# Patient Record
Sex: FEMALE | Race: WHITE | URBAN - METROPOLITAN AREA
[De-identification: names, ages, dates, MRNs, and addresses within clinical notes are randomized per-mention and may not be internally consistent; named-entity substitution may affect disease eponyms.]

---

## 2018-10-09 ENCOUNTER — APPOINTMENT (RX ONLY)
Dept: URBAN - METROPOLITAN AREA CLINIC 99 | Facility: CLINIC | Age: 68
Setting detail: DERMATOLOGY
End: 2018-10-09

## 2018-10-09 DIAGNOSIS — Z12.83 ENCOUNTER FOR SCREENING FOR MALIGNANT NEOPLASM OF SKIN: ICD-10-CM

## 2018-10-09 DIAGNOSIS — L81.4 OTHER MELANIN HYPERPIGMENTATION: ICD-10-CM

## 2018-10-09 DIAGNOSIS — D22 MELANOCYTIC NEVI: ICD-10-CM

## 2018-10-09 DIAGNOSIS — L82.1 OTHER SEBORRHEIC KERATOSIS: ICD-10-CM

## 2018-10-09 DIAGNOSIS — L57.0 ACTINIC KERATOSIS: ICD-10-CM

## 2018-10-09 PROBLEM — F41.9 ANXIETY DISORDER, UNSPECIFIED: Status: ACTIVE | Noted: 2018-10-09

## 2018-10-09 PROBLEM — M12.9 ARTHROPATHY, UNSPECIFIED: Status: ACTIVE | Noted: 2018-10-09

## 2018-10-09 PROBLEM — D22.5 MELANOCYTIC NEVI OF TRUNK: Status: ACTIVE | Noted: 2018-10-09

## 2018-10-09 PROBLEM — R23.3 SPONTANEOUS ECCHYMOSES: Status: ACTIVE | Noted: 2018-10-09

## 2018-10-09 PROBLEM — E78.5 HYPERLIPIDEMIA, UNSPECIFIED: Status: ACTIVE | Noted: 2018-10-09

## 2018-10-09 PROBLEM — I10 ESSENTIAL (PRIMARY) HYPERTENSION: Status: ACTIVE | Noted: 2018-10-09

## 2018-10-09 PROCEDURE — 99213 OFFICE O/P EST LOW 20 MIN: CPT | Mod: 25

## 2018-10-09 PROCEDURE — 17003 DESTRUCT PREMALG LES 2-14: CPT

## 2018-10-09 PROCEDURE — 17000 DESTRUCT PREMALG LESION: CPT

## 2018-10-09 PROCEDURE — ? COUNSELING

## 2018-10-09 PROCEDURE — ? LIQUID NITROGEN

## 2018-10-09 ASSESSMENT — LOCATION ZONE DERM
LOCATION ZONE: NOSE
LOCATION ZONE: FACE
LOCATION ZONE: ARM
LOCATION ZONE: SCALP
LOCATION ZONE: TRUNK

## 2018-10-09 ASSESSMENT — LOCATION SIMPLE DESCRIPTION DERM
LOCATION SIMPLE: LEFT SCALP
LOCATION SIMPLE: RIGHT FOREARM
LOCATION SIMPLE: CHEST
LOCATION SIMPLE: LEFT UPPER BACK
LOCATION SIMPLE: ABDOMEN
LOCATION SIMPLE: RIGHT FOREHEAD
LOCATION SIMPLE: NOSE

## 2018-10-09 ASSESSMENT — LOCATION DETAILED DESCRIPTION DERM
LOCATION DETAILED: RIGHT DISTAL DORSAL FOREARM
LOCATION DETAILED: NASAL DORSUM
LOCATION DETAILED: RIGHT LATERAL SUPERIOR CHEST
LOCATION DETAILED: LEFT MID-UPPER BACK
LOCATION DETAILED: LEFT MEDIAL FRONTAL SCALP
LOCATION DETAILED: RIGHT LATERAL FOREHEAD
LOCATION DETAILED: RIGHT SUPERIOR MEDIAL FOREHEAD
LOCATION DETAILED: SUBXIPHOID
LOCATION DETAILED: PERIUMBILICAL SKIN
LOCATION DETAILED: EPIGASTRIC SKIN

## 2018-10-09 NOTE — PROCEDURE: LIQUID NITROGEN
Detail Level: Detailed
Post-Care Instructions: I reviewed with the patient in detail post-care instructions. Patient is to wear sunprotection, and avoid picking at any of the treated lesions. Pt may apply Vaseline to crusted or scabbing areas.
Number Of Freeze-Thaw Cycles: 2 freeze-thaw cycles
Duration Of Freeze Thaw-Cycle (Seconds): 10
Consent: The patient's consent was obtained including but not limited to risks of crusting, scabbing, blistering, scarring, darker or lighter pigmentary change, recurrence, incomplete removal and infection.
Render Post-Care Instructions In Note?: no

## 2021-06-15 ENCOUNTER — TRANSCRIBE ORDERS (OUTPATIENT)
Dept: ADMINISTRATIVE | Facility: HOSPITAL | Age: 71
End: 2021-06-15

## 2021-06-15 DIAGNOSIS — R10.13 EPIGASTRIC PAIN: Primary | ICD-10-CM

## 2021-06-17 ENCOUNTER — TRANSCRIBE ORDERS (OUTPATIENT)
Dept: ADMINISTRATIVE | Facility: HOSPITAL | Age: 71
End: 2021-06-17

## 2021-06-23 ENCOUNTER — LAB (OUTPATIENT)
Dept: LAB | Facility: HOSPITAL | Age: 71
End: 2021-06-23

## 2021-06-23 DIAGNOSIS — R10.9 ABDOMINAL PAIN, UNSPECIFIED ABDOMINAL LOCATION: ICD-10-CM

## 2021-06-23 DIAGNOSIS — R10.9 ABDOMINAL PAIN, UNSPECIFIED ABDOMINAL LOCATION: Primary | ICD-10-CM

## 2021-06-23 LAB — SARS-COV-2 RNA PNL SPEC NAA+PROBE: NOT DETECTED

## 2021-06-23 PROCEDURE — C9803 HOPD COVID-19 SPEC COLLECT: HCPCS

## 2021-06-23 PROCEDURE — 87635 SARS-COV-2 COVID-19 AMP PRB: CPT

## 2021-06-24 ENCOUNTER — HOSPITAL ENCOUNTER (OUTPATIENT)
Dept: GENERAL RADIOLOGY | Facility: HOSPITAL | Age: 71
Discharge: HOME | End: 2021-06-24
Admitting: SURGERY

## 2021-06-24 DIAGNOSIS — R10.13 EPIGASTRIC PAIN: ICD-10-CM

## 2021-06-24 PROCEDURE — 63710000001 BARIUM SULFATE 700 MG TABLET: Performed by: SURGERY

## 2021-06-24 PROCEDURE — A9270 NON-COVERED ITEM OR SERVICE: HCPCS | Performed by: SURGERY

## 2021-06-24 PROCEDURE — 63710000001 BARIUM SULFATE 98 % RECONSTITUTED SUSPENSION: Performed by: SURGERY

## 2021-06-24 PROCEDURE — 63710000001 BARIUM SULFATE 96 % RECONSTITUTED SUSPENSION: Performed by: SURGERY

## 2021-06-24 PROCEDURE — 74220 X-RAY XM ESOPHAGUS 1CNTRST: CPT

## 2021-06-24 RX ADMIN — BARIUM SULFATE 183 ML: 960 POWDER, FOR SUSPENSION ORAL at 09:04

## 2021-06-24 RX ADMIN — BARIUM SULFATE 700 MG: 700 TABLET ORAL at 09:04

## 2021-06-24 RX ADMIN — BARIUM SULFATE 135 ML: 980 POWDER, FOR SUSPENSION ORAL at 09:04

## 2022-08-10 ENCOUNTER — OFFICE (AMBULATORY)
Dept: URBAN - METROPOLITAN AREA PATHOLOGY 4 | Facility: PATHOLOGY | Age: 72
End: 2022-08-10
Payer: COMMERCIAL

## 2022-08-10 ENCOUNTER — OFFICE (AMBULATORY)
Dept: URBAN - METROPOLITAN AREA PATHOLOGY 4 | Facility: PATHOLOGY | Age: 72
End: 2022-08-10

## 2022-08-10 ENCOUNTER — ON CAMPUS - OUTPATIENT (AMBULATORY)
Dept: URBAN - METROPOLITAN AREA HOSPITAL 2 | Facility: HOSPITAL | Age: 72
End: 2022-08-10

## 2022-08-10 VITALS
SYSTOLIC BLOOD PRESSURE: 142 MMHG | RESPIRATION RATE: 16 BRPM | SYSTOLIC BLOOD PRESSURE: 155 MMHG | HEART RATE: 66 BPM | DIASTOLIC BLOOD PRESSURE: 74 MMHG | DIASTOLIC BLOOD PRESSURE: 60 MMHG | SYSTOLIC BLOOD PRESSURE: 147 MMHG | SYSTOLIC BLOOD PRESSURE: 128 MMHG | OXYGEN SATURATION: 97 % | RESPIRATION RATE: 18 BRPM | HEART RATE: 62 BPM | SYSTOLIC BLOOD PRESSURE: 115 MMHG | HEART RATE: 68 BPM | HEIGHT: 65 IN | SYSTOLIC BLOOD PRESSURE: 118 MMHG | RESPIRATION RATE: 14 BRPM | TEMPERATURE: 96.4 F | OXYGEN SATURATION: 98 % | DIASTOLIC BLOOD PRESSURE: 41 MMHG | SYSTOLIC BLOOD PRESSURE: 137 MMHG | OXYGEN SATURATION: 99 % | HEART RATE: 64 BPM | HEART RATE: 79 BPM | DIASTOLIC BLOOD PRESSURE: 100 MMHG | SYSTOLIC BLOOD PRESSURE: 104 MMHG | DIASTOLIC BLOOD PRESSURE: 79 MMHG | HEART RATE: 76 BPM | HEART RATE: 78 BPM | HEART RATE: 69 BPM | RESPIRATION RATE: 20 BRPM | SYSTOLIC BLOOD PRESSURE: 114 MMHG | WEIGHT: 229 LBS | DIASTOLIC BLOOD PRESSURE: 75 MMHG | OXYGEN SATURATION: 96 % | OXYGEN SATURATION: 100 % | DIASTOLIC BLOOD PRESSURE: 63 MMHG | DIASTOLIC BLOOD PRESSURE: 53 MMHG | DIASTOLIC BLOOD PRESSURE: 42 MMHG | SYSTOLIC BLOOD PRESSURE: 149 MMHG

## 2022-08-10 DIAGNOSIS — R13.10 DYSPHAGIA, UNSPECIFIED: ICD-10-CM

## 2022-08-10 DIAGNOSIS — D12.3 BENIGN NEOPLASM OF TRANSVERSE COLON: ICD-10-CM

## 2022-08-10 DIAGNOSIS — K64.1 SECOND DEGREE HEMORRHOIDS: ICD-10-CM

## 2022-08-10 DIAGNOSIS — Z86.010 PERSONAL HISTORY OF COLONIC POLYPS: ICD-10-CM

## 2022-08-10 DIAGNOSIS — K57.30 DIVERTICULOSIS OF LARGE INTESTINE WITHOUT PERFORATION OR ABS: ICD-10-CM

## 2022-08-10 PROBLEM — K63.5 POLYP OF COLON: Status: ACTIVE | Noted: 2022-08-10

## 2022-08-10 LAB
GI HISTOLOGY: A. UNSPECIFIED: (no result)
GI HISTOLOGY: PDF REPORT: (no result)

## 2022-08-10 PROCEDURE — 43235 EGD DIAGNOSTIC BRUSH WASH: CPT | Performed by: INTERNAL MEDICINE

## 2022-08-10 PROCEDURE — 43450 DILATE ESOPHAGUS 1/MULT PASS: CPT | Performed by: INTERNAL MEDICINE

## 2022-08-10 PROCEDURE — 88305 TISSUE EXAM BY PATHOLOGIST: CPT | Mod: 26 | Performed by: INTERNAL MEDICINE

## 2022-08-10 PROCEDURE — 45380 COLONOSCOPY AND BIOPSY: CPT | Mod: PT | Performed by: INTERNAL MEDICINE

## 2022-12-02 ENCOUNTER — APPOINTMENT (OUTPATIENT)
Dept: GENERAL RADIOLOGY | Facility: HOSPITAL | Age: 72
End: 2022-12-02

## 2022-12-02 ENCOUNTER — HOSPITAL ENCOUNTER (OUTPATIENT)
Facility: HOSPITAL | Age: 72
Discharge: HOME | End: 2022-12-02
Attending: EMERGENCY MEDICINE | Admitting: EMERGENCY MEDICINE

## 2022-12-02 VITALS
OXYGEN SATURATION: 98 % | HEART RATE: 67 BPM | DIASTOLIC BLOOD PRESSURE: 53 MMHG | TEMPERATURE: 98.4 F | BODY MASS INDEX: 36.65 KG/M2 | HEIGHT: 65 IN | WEIGHT: 220 LBS | RESPIRATION RATE: 18 BRPM | SYSTOLIC BLOOD PRESSURE: 99 MMHG

## 2022-12-02 DIAGNOSIS — J40 BRONCHITIS: Primary | ICD-10-CM

## 2022-12-02 DIAGNOSIS — R05.9 COUGH IN ADULT: ICD-10-CM

## 2022-12-02 PROBLEM — N64.89: Status: ACTIVE | Noted: 2022-12-02

## 2022-12-02 PROBLEM — D72.829 LEUKOCYTOSIS: Status: ACTIVE | Noted: 2022-01-04

## 2022-12-02 PROBLEM — G89.29 CHRONIC HEADACHE DISORDER: Status: ACTIVE | Noted: 2022-12-02

## 2022-12-02 PROBLEM — H40.9 GLAUCOMA: Status: ACTIVE | Noted: 2022-12-02

## 2022-12-02 PROBLEM — F41.0 ANXIETY ATTACK: Status: ACTIVE | Noted: 2022-12-02

## 2022-12-02 PROBLEM — K64.9 HEMORRHOID: Status: ACTIVE | Noted: 2022-12-02

## 2022-12-02 PROBLEM — R00.2 PALPITATIONS: Status: ACTIVE | Noted: 2022-01-06

## 2022-12-02 PROBLEM — G47.30 SLEEP APNEA: Status: ACTIVE | Noted: 2022-12-02

## 2022-12-02 PROBLEM — H35.40 PERIPHERAL RETINAL DEGENERATION: Status: ACTIVE | Noted: 2019-08-02

## 2022-12-02 PROBLEM — R06.02 SHORTNESS OF BREATH: Status: ACTIVE | Noted: 2020-09-02

## 2022-12-02 PROBLEM — R51.9 CHRONIC HEADACHE DISORDER: Status: ACTIVE | Noted: 2022-12-02

## 2022-12-02 PROBLEM — H26.9 CATARACT: Status: ACTIVE | Noted: 2022-12-02

## 2022-12-02 PROBLEM — I10 HTN (HYPERTENSION): Status: ACTIVE | Noted: 2021-11-19

## 2022-12-02 PROBLEM — N39.0 RECURRENT URINARY TRACT INFECTION: Status: ACTIVE | Noted: 2022-12-02

## 2022-12-02 LAB
FLUAV SUBTYP SPEC NAA+PROBE: NOT DETECTED
FLUBV RNA ISLT QL NAA+PROBE: NOT DETECTED
SARS-COV-2 RNA RESP QL NAA+PROBE: NOT DETECTED

## 2022-12-02 PROCEDURE — 87636 SARSCOV2 & INF A&B AMP PRB: CPT | Performed by: EMERGENCY MEDICINE

## 2022-12-02 PROCEDURE — G0463 HOSPITAL OUTPT CLINIC VISIT: HCPCS | Performed by: NURSE PRACTITIONER

## 2022-12-02 PROCEDURE — 71046 X-RAY EXAM CHEST 2 VIEWS: CPT

## 2022-12-02 PROCEDURE — 99203 OFFICE O/P NEW LOW 30 MIN: CPT | Performed by: NURSE PRACTITIONER

## 2022-12-02 RX ORDER — TIZANIDINE 4 MG/1
4 TABLET ORAL
COMMUNITY

## 2022-12-02 RX ORDER — CHOLECALCIFEROL (VITAMIN D3) 125 MCG
10 CAPSULE ORAL
COMMUNITY

## 2022-12-02 RX ORDER — PRAVASTATIN SODIUM 40 MG
40 TABLET ORAL DAILY
COMMUNITY

## 2022-12-02 RX ORDER — METHYLPREDNISOLONE 4 MG/1
TABLET ORAL
Qty: 21 TABLET | Refills: 0 | Status: SHIPPED | OUTPATIENT
Start: 2022-12-02

## 2022-12-02 RX ORDER — DARIFENACIN HYDROBROMIDE 7.5 MG/1
15 TABLET, EXTENDED RELEASE ORAL DAILY
COMMUNITY

## 2022-12-02 RX ORDER — OLMESARTAN MEDOXOMIL AND HYDROCHLOROTHIAZIDE 40/12.5 40; 12.5 MG/1; MG/1
1 TABLET ORAL DAILY
COMMUNITY

## 2022-12-02 RX ORDER — CITALOPRAM 40 MG/1
40 TABLET ORAL DAILY
COMMUNITY

## 2022-12-02 RX ORDER — BENZONATATE 200 MG/1
200 CAPSULE ORAL 3 TIMES DAILY PRN
Qty: 15 CAPSULE | Refills: 0 | Status: SHIPPED | OUTPATIENT
Start: 2022-12-02

## 2022-12-02 RX ORDER — PROPRANOLOL HYDROCHLORIDE 120 MG/1
120 CAPSULE, EXTENDED RELEASE ORAL DAILY
COMMUNITY

## 2022-12-02 NOTE — DISCHARGE INSTRUCTIONS
Thank you for letting us care for you today.  You have tested negative for COVID and influenza.  You are being treated with steroids and cough medication.  Please ensure that you schedule an appointment with your primary care provider to ensure complete recovery.

## 2022-12-02 NOTE — FSED PROVIDER NOTE
EMERGENCY DEPARTMENT ENCOUNTER      Room Number: 10/10    History is provided by the patient, no translation services needed    HPI:    Chief complaint: Fatigue, fever, cough, congestion, rhinorrhea, postnasal drip, decreased appetite, headache    Quality/Severity: Moderate cough    Timing/Duration: last Thursday and has continued    Modifying Factors: Not made better with over-the-counter medications    Associated Symptoms: Spouse is positive with same symptoms    Narrative: Pt is a 72 y.o. female with a past medical history of hypertension, sleep apnea, chronic headache who presents complaining of Fatigue, fever, cough, congestion, rhinorrhea, postnasal drip, decreased appetite, headache that began last Thursday and has continued.  She states she has been taking over-the-counter medication with no relief in her symptoms.  She states the cough does affect her ability to sleep.  She describes it as a harsh cough but she is unable to cough up any secretions.  She denies of shortness of breath, dizziness, syncopal episodes.  She is alert, oriented, no acute distress      PMD: Zachariah Dow MD    REVIEW OF SYSTEMS  Review of Systems   Constitutional: Positive for appetite change, fatigue and fever. Negative for activity change, chills, diaphoresis and unexpected weight change.   HENT: Positive for congestion, postnasal drip, rhinorrhea and sneezing. Negative for facial swelling, sinus pressure, sinus pain and sore throat.    Eyes: Negative for itching and visual disturbance.   Respiratory: Positive for cough. Negative for chest tightness and shortness of breath.    Cardiovascular: Negative for chest pain, palpitations and leg swelling.   Gastrointestinal: Positive for diarrhea and nausea. Negative for vomiting.   Genitourinary: Negative.    Musculoskeletal: Negative for arthralgias and myalgias.   Skin: Negative for pallor and rash.   Allergic/Immunologic: Negative.    Neurological: Positive for headaches.  Negative for dizziness, weakness and light-headedness.   Hematological: Negative.    Psychiatric/Behavioral: Negative.          PAST MEDICAL HISTORY  Active Ambulatory Problems     Diagnosis Date Noted   • Hemorrhoid 12/02/2022   • Anxiety attack 12/02/2022   • Breast mass 02/21/2014   • Cataract 12/02/2022   • Chronic headache disorder 12/02/2022   • Glaucoma 12/02/2022   • H/O laparoscopic adjustable gastric banding 04/18/2014   • Hematoma 02/24/2014   • HTN (hypertension) 11/19/2021   • Leukocytosis 01/04/2022   • Nontraumatic hematoma of breast 12/02/2022   • Temporomandibular joint disorder 04/18/2014   • Palpitations 01/06/2022   • Peripheral retinal degeneration 08/02/2019   • Recurrent urinary tract infection 12/02/2022   • Shortness of breath 09/02/2020   • Sleep apnea 12/02/2022     Resolved Ambulatory Problems     Diagnosis Date Noted   • No Resolved Ambulatory Problems     No Additional Past Medical History       PAST SURGICAL HISTORY  History reviewed. No pertinent surgical history.    FAMILY HISTORY  History reviewed. No pertinent family history.    SOCIAL HISTORY  Social History     Socioeconomic History   • Marital status:        ALLERGIES  Sulfa antibiotics, Codeine, Hydrocodone-acetaminophen, Meperidine, and Tramadol    No current facility-administered medications for this encounter.    Current Outpatient Medications:   •  benzonatate (TESSALON) 200 MG capsule, Take 1 capsule by mouth 3 (Three) Times a Day As Needed for Cough., Disp: 15 capsule, Rfl: 0  •  citalopram (CeleXA) 40 MG tablet, Take 40 mg by mouth Daily., Disp: , Rfl:   •  darifenacin (ENABLEX) 7.5 MG 24 hr tablet, Take 15 mg by mouth Daily., Disp: , Rfl:   •  melatonin 5 MG tablet tablet, 10 mg., Disp: , Rfl:   •  methylPREDNISolone (MEDROL) 4 MG dose pack, Take as directed on package instructions., Disp: 21 tablet, Rfl: 0  •  Mirabegron ER (MYRBETRIQ) 50 MG tablet sustained-release 24 hour 24 hr tablet, Take 50 mg by mouth  Daily., Disp: , Rfl:   •  olmesartan-hydrochlorothiazide (BENICAR HCT) 40-12.5 MG per tablet, Take 1 tablet by mouth Daily., Disp: , Rfl:   •  pravastatin (PRAVACHOL) 40 MG tablet, Take 40 mg by mouth Daily., Disp: , Rfl:   •  propranolol LA (INDERAL LA) 120 MG 24 hr capsule, Take 120 mg by mouth Daily., Disp: , Rfl:   •  tiZANidine (ZANAFLEX) 4 MG tablet, Take 4 mg by mouth., Disp: , Rfl:     PHYSICAL EXAM  ED Triage Vitals   Temp Heart Rate Resp BP SpO2   12/02/22 0959 12/02/22 0959 12/02/22 0959 12/02/22 1004 12/02/22 0959   98.4 °F (36.9 °C) 67 18 99/53 98 %      Temp src Heart Rate Source Patient Position BP Location FiO2 (%)   12/02/22 0959 12/02/22 0959 12/02/22 0959 12/02/22 0959 --   Oral Monitor Sitting Right arm        Physical Exam  Vitals and nursing note reviewed.   Constitutional:       General: She is not in acute distress.     Appearance: Normal appearance. She is obese. She is not ill-appearing, toxic-appearing or diaphoretic.   HENT:      Head: Normocephalic and atraumatic.      Right Ear: Tympanic membrane, ear canal and external ear normal. There is no impacted cerumen.      Left Ear: Tympanic membrane, ear canal and external ear normal. There is no impacted cerumen.      Nose: Nose normal. No congestion or rhinorrhea.      Mouth/Throat:      Mouth: Mucous membranes are moist.      Pharynx: Oropharynx is clear. No oropharyngeal exudate or posterior oropharyngeal erythema.   Eyes:      Extraocular Movements: Extraocular movements intact.      Conjunctiva/sclera: Conjunctivae normal.   Cardiovascular:      Rate and Rhythm: Normal rate and regular rhythm.      Pulses: Normal pulses.      Heart sounds: Normal heart sounds.   Pulmonary:      Effort: Pulmonary effort is normal.      Breath sounds: Examination of the right-lower field reveals rales. Rales present.   Musculoskeletal:         General: Normal range of motion.      Cervical back: Normal range of motion and neck supple. No rigidity or  tenderness.   Lymphadenopathy:      Cervical: No cervical adenopathy.   Skin:     General: Skin is warm and dry.      Capillary Refill: Capillary refill takes less than 2 seconds.   Neurological:      General: No focal deficit present.      Mental Status: She is alert and oriented to person, place, and time.   Psychiatric:         Mood and Affect: Mood normal.         Behavior: Behavior normal.           LAB RESULTS  Lab Results (last 24 hours)     Procedure Component Value Units Date/Time    COVID-19 and FLU A/B PCR - Swab, Nasopharynx [[id number]]  (Normal) Collected: 12/02/22 1009    Specimen: Swab from Nasopharynx Updated: 12/02/22 1033     COVID19 Not Detected     Influenza A PCR Not Detected     Influenza B PCR Not Detected    Narrative:      Fact sheet for providers: https://www.fda.gov/media/203850/download    Fact sheet for patients: https://www.fda.gov/media/284886/download    Test performed by PCR.            I ordered the above labs and reviewed the results    RADIOLOGY  XR Chest 2 View    Result Date: 12/2/2022  DATE OF EXAM: 12/2/2022 10:59 AM  PROCEDURE: XR CHEST 2 VW-  INDICATIONS: Cough, past tobacco abuse.   COMPARISON: No Comparisons Available  TECHNIQUE: Two radiologic views of the chest.  FINDINGS: The heart size is normal. The pulmonary vascular markings are normal. The lungs and pleural spaces are clear of active disease.  There are chronic age-related changes involving the bony thorax.      No active disease.  Electronically Signed By-Charly Miranda MD On:12/2/2022 11:05 AM This report was finalized on [id number] by  Charly Miranda MD.      I ordered the above radiologic testing and reviewed the results    PROCEDURES  Procedures      PROGRESS AND CONSULTS  ED Course as of 12/02/22 1127   Fri Dec 02, 2022   1033 Negative for covid and flu [VT]   1110 IMPRESSION:  No active disease.     Electronically Signed By-Charly Miranda MD On:12/2/2022 11:05 AM  This report was finalized on [id number] by   Charly Miranda MD. [VT]   1125 Negative chest x-ray discussed with patient.  Plan of care discussed with patient [VT]      ED Course User Index  [VT] Nichol Redding, CAILIN           MEDICAL DECISION MAKING    MDM       DIAGNOSIS  Final diagnoses:   Bronchitis   Cough in adult       Latest Documented Vital Signs:  As of 11:27 EST  BP- 99/53 HR- 67 Temp- 98.4 °F (36.9 °C) (Oral) O2 sat- 98%    DISPOSITION      Discussed pertinent findings with the patient/family.  Patient/Family voiced understanding of need to follow-up for recheck and further testing as needed.  Return to the Emergency Department warnings were given.         Medication List      New Prescriptions    benzonatate 200 MG capsule  Commonly known as: TESSALON  Take 1 capsule by mouth 3 (Three) Times a Day As Needed for Cough.     methylPREDNISolone 4 MG dose pack  Commonly known as: MEDROL  Take as directed on package instructions.           Where to Get Your Medications      These medications were sent to [facility] [id number] [place], [place] - [facility] - [phone] [facility] [phone] [address] [place] [address]    Phone: [phone]   · benzonatate 200 MG capsule  · methylPREDNISolone 4 MG dose pack              Follow-up Information     Zachariah Dow MD. Call in 3 days.    Specialty: Family Medicine  Why: As needed, If symptoms worsen  Contact information:  [address]  [facility] [address]  [place] [address]  [phone]                           Dictated utilizing Dragon dictation

## 2023-04-13 ENCOUNTER — HOSPITAL ENCOUNTER (OUTPATIENT)
Facility: HOSPITAL | Age: 73
Discharge: HOME | End: 2023-04-13
Attending: EMERGENCY MEDICINE | Admitting: EMERGENCY MEDICINE
Payer: MEDICARE

## 2023-04-13 VITALS
SYSTOLIC BLOOD PRESSURE: 121 MMHG | WEIGHT: 220 LBS | OXYGEN SATURATION: 97 % | HEIGHT: 65 IN | BODY MASS INDEX: 36.65 KG/M2 | RESPIRATION RATE: 16 BRPM | DIASTOLIC BLOOD PRESSURE: 56 MMHG | HEART RATE: 54 BPM | TEMPERATURE: 97.9 F

## 2023-04-13 DIAGNOSIS — U07.1 COVID-19 VIRUS DETECTED: Primary | ICD-10-CM

## 2023-04-13 LAB
FLUAV SUBTYP SPEC NAA+PROBE: NOT DETECTED
FLUBV RNA ISLT QL NAA+PROBE: NOT DETECTED
SARS-COV-2 RNA RESP QL NAA+PROBE: DETECTED

## 2023-04-13 PROCEDURE — 87636 SARSCOV2 & INF A&B AMP PRB: CPT | Performed by: EMERGENCY MEDICINE

## 2023-04-13 PROCEDURE — G0463 HOSPITAL OUTPT CLINIC VISIT: HCPCS | Performed by: NURSE PRACTITIONER

## 2023-04-13 RX ORDER — DEXTROMETHORPHAN HYDROBROMIDE AND PROMETHAZINE HYDROCHLORIDE 15; 6.25 MG/5ML; MG/5ML
5 SYRUP ORAL 3 TIMES DAILY PRN
Qty: 118 ML | Refills: 0 | Status: SHIPPED | OUTPATIENT
Start: 2023-04-13

## 2023-04-13 NOTE — FSED PROVIDER NOTE
EMERGENCY DEPARTMENT ENCOUNTER    Room Number:  CESARIO/CESARIO  Date seen:  4/13/2023  Time seen: 12:09 EDT  PCP: Zachariah Dow MD  Historian: patient    HPI:  Chief complaint:cough and headache  A complete HPI/ROS/PMH/PSH/SH/FH are unobtainable due to: n/a  Context:Sofia Chavez is a 73 y.o. female with h/o HTN, hyperlipidemia and leukocytosis who presents to the ED with c/o cough and headache that started last Friday.  Symptoms are moderate and she has been taking tylenol and tessalon perles.  Denies shortness of breath, n/v but has had some diarrhea.  Is vaccinated against Covid 19.  States last week she had appointment for lumbar epidural steroid injection.        Social determinants of health which may impact assessment: n/a    Review of prior external notes (non-ED): n/a    Review of prior external test results outside of this encounter: n/a    ALLERGIES  Sulfa antibiotics, Codeine, Hydrocodone-acetaminophen, Meperidine, and Tramadol    PAST MEDICAL HISTORY  Active Ambulatory Problems     Diagnosis Date Noted   • Hemorrhoid 12/02/2022   • Anxiety attack 12/02/2022   • Breast mass 02/21/2014   • Cataract 12/02/2022   • Chronic headache disorder 12/02/2022   • Glaucoma 12/02/2022   • H/O laparoscopic adjustable gastric banding 04/18/2014   • Hematoma 02/24/2014   • HTN (hypertension) 11/19/2021   • Leukocytosis 01/04/2022   • Nontraumatic hematoma of breast 12/02/2022   • Temporomandibular joint disorder 04/18/2014   • Palpitations 01/06/2022   • Peripheral retinal degeneration 08/02/2019   • Recurrent urinary tract infection 12/02/2022   • Shortness of breath 09/02/2020   • Sleep apnea 12/02/2022     Resolved Ambulatory Problems     Diagnosis Date Noted   • No Resolved Ambulatory Problems     No Additional Past Medical History       PAST SURGICAL HISTORY  No past surgical history on file.    FAMILY HISTORY  No family history on file.    SOCIAL HISTORY  Social History     Socioeconomic History   •  Marital status:        REVIEW OF SYSTEMS  Review of Systems    All systems reviewed and negative except for those discussed in HPI.     PHYSICAL EXAM    I have reviewed the triage vital signs and nursing notes.  Vitals:    04/13/23 1038   BP:    Pulse:    Resp:    Temp: 97.9 °F (36.6 °C)   SpO2:      Physical Exam    GENERAL: not distressed  HENT: nares patent, TM's normal bilaterally, no tonsillar edema or erythema  EYES: no scleral icterus  NECK: no ROM limitations  CV: regular rhythm, regular rate, no murmur  RESPIRATORY: normal effort, CTAB, no wheezing  ABDOMEN: soft  : deferred  MUSCULOSKELETAL: no deformity  NEURO: alert, moves all extremities, follows commands  SKIN: warm, dry    LAB RESULTS  Recent Results (from the past 24 hour(s))   COVID-19 and FLU A/B PCR - Swab, Nasopharynx    Collection Time: 04/13/23 10:42 AM    Specimen: Nasopharynx; Swab   Result Value Ref Range    COVID19 Detected (C) Not Detected - Ref. Range    Influenza A PCR Not Detected Not Detected    Influenza B PCR Not Detected Not Detected       Ordered the above labs and independently interpreted results.  My findings will be discussed in the ED course or medical decision making section below    PROGRESS, DATA ANALYSIS, CONSULTS AND MEDICAL DECISION MAKING    Please note that this section constitutes my independent interpretation of clinical data including lab results, radiology, EKG's.  This constitutes my independent professional opinion regarding differential diagnosis and management of this patient.  It may include any factors such as history from outside sources, review of external records, social determinants of health, management of medications, response to those treatments, and discussions with other providers.      ED Course as of 04/13/23 1219   Thu Apr 13, 2023   1133 COVID19(!!): Detected  The patient is on day 6 of covid symptoms. She is  not tachycardic or hypoxic and denies acute shortness of breath.  She would  like Phenergan DM for her cough.  We discussed and had shared decision making regarding Paxlovid and she ultimately declined.  This is reasonable.  Home care precautions and quarantine and masking in public discussed [EW]      ED Course User Index  [EW] Concepción Kirby APRN       Orders placed during this visit:  Orders Placed This Encounter   Procedures   • COVID-19 and FLU A/B PCR - Swab, Nasopharynx          MDM see ED course    DIAGNOSIS  Final diagnoses:   COVID-19 virus detected       FOLLOW-UP  Zachariah Dow MD  [address] [facility] [address]  [facility] [address]  [phone]    Schedule an appointment as soon as possible for a visit in 1 week  As needed, If symptoms worsen        Latest Documented Vital Signs:  As of 12:19 EDT  BP- 121/56 HR- 54 Temp- 97.9 °F (36.6 °C) O2 sat- 97%    Appropriate PPE utilized throughout this patient encounter to include mask and eye protection, per current protocol. Hand hygiene was performed before donning PPE and after removal when leaving the room.    Please note that portions of this were completed with a voice recognition program.     Note Disclaimer: At [facility], we believe that sharing information builds trust and better relationships. You are receiving this note because you are receiving care at [facility] or recently visited. It is possible you will see health information before a provider has talked with you about it. This kind of information can be easy to misunderstand. To help you fully understand what it means for your health, we urge you to discuss this note with your provider.

## 2023-04-13 NOTE — DISCHARGE INSTRUCTIONS
"Virus precautions, simple things to do at home to help with illness    You have been diagnosed with COVID-19 viral illness, supportive care recommended.    Wash/sanitize common household surfaces with antibacterial wipes.  Especially door knobs, light switches.    Change bed linens and wash bath towels/washcloths    Frequent handwashing    Cough/sneeze into your sleeve    Treat fever every 6-8 hours with age appropriate Tylenol (generic acetaminophen) or Ibuprofen according to package directions.      Over-the-counter medications for symptomatic relief may include:  Sudafed, DayQuil/NyQuil, flonase nasal spray.  If you have history of high blood pressure please use caution with these medications.  Check with pharmacist for recommendations.  Coricidin has brand \"HBP\" which is better for patient's with high blood pressure.     Over-the-counter supplements including vitamin C, zinc  may also be helpful.    Return Precautions    Although you are being discharged from the ED today, I encourage you to return for worsening symptoms.  Things can, and do, change such that treatment at home with medication may not be adequate.      Specifically, return for any of the following:    Chest pain, shortness of breath, pain or nausea and vomiting not controlled by medications provided.    Please make a follow up with your Primary Care Provider for a blood pressure recheck.           "

## 2023-05-15 NOTE — PROGRESS NOTES
Neurosurgical Consultation      Sofia Chavez is a 73 y.o. female is being seen for consultation today at the request of CAILIN Spencer for meningioma. Today patient reports meningioma and had abnormal cat scan. Neck pain mainly on right side, steroid injections previously. Radiates down bilateral arms and hands, and has falls.     Chief Complaint   Patient presents with   • Neck Pain     New patient         Previous treatment:    HPI: This is a 73-year-old woman with chronic neck pain.  She is managed in a pain management clinic for approximately 17 to 18 years.  She has never had any neck surgery.  In being worked up for neck pain and new CT of her cervical spine was obtained.  This imaging extended into the lower portion of her brain and identified a calcified dural based mass.  She was then encouraged to undergo a CT with and without contrast of the head.  She has retained metal in her body and can therefore not undergo an MRI of her brain.  She does have a sister with a history of a meningioma that did require resection.  Her sister has since  but it was not related to the meningioma.  She does not have any new vision issues, indication of seizures, new headaches.    With respect to her neck she does describe history of left-sided pain and numbness into her thumb and first digit.  This has lessened recently.    History reviewed. No pertinent past medical history.     History reviewed. No pertinent surgical history.     Current Outpatient Medications on File Prior to Visit   Medication Sig Dispense Refill   • benzonatate (TESSALON) 200 MG capsule Take 1 capsule by mouth 3 (Three) Times a Day As Needed for Cough. 15 capsule 0   • citalopram (CeleXA) 40 MG tablet Take 1 tablet by mouth Daily.     • darifenacin (ENABLEX) 7.5 MG 24 hr tablet Take 2 tablets by mouth Daily.     • ergocalciferol (ERGOCALCIFEROL) 1.25 MG (03532 UT) capsule      • gabapentin (NEURONTIN) 300 MG capsule      • melatonin 5  MG tablet tablet 2 tablets.     • metFORMIN (GLUCOPHAGE) 500 MG tablet      • methylPREDNISolone (MEDROL) 4 MG dose pack Take as directed on package instructions. 21 tablet 0   • Mirabegron ER (MYRBETRIQ) 50 MG tablet sustained-release 24 hour 24 hr tablet Take 50 mg by mouth Daily.     • olmesartan-hydrochlorothiazide (BENICAR HCT) 40-12.5 MG per tablet Take 1 tablet by mouth Daily.     • pravastatin (PRAVACHOL) 40 MG tablet Take 1 tablet by mouth Daily.     • promethazine-dextromethorphan (PROMETHAZINE-DM) 6.25-15 MG/5ML syrup Take 5 mL by mouth 3 (Three) Times a Day As Needed for Cough. 118 mL 0   • propranolol LA (INDERAL LA) 120 MG 24 hr capsule Take 1 capsule by mouth Daily.     • tiZANidine (ZANAFLEX) 4 MG tablet Take 1 tablet by mouth.       No current facility-administered medications on file prior to visit.        Allergies   Allergen Reactions   • Sulfa Antibiotics Dizziness   • Codeine Other (See Comments)     pt tolerates norco, but starts w/half tab if needed     • Hydrocodone-Acetaminophen Other (See Comments)   • Meperidine Anxiety and Other (See Comments)   • Tramadol Other (See Comments)        Social History     Socioeconomic History   • Marital status:    Tobacco Use   • Smoking status: Never     Passive exposure: Never   • Smokeless tobacco: Never   Vaping Use   • Vaping Use: Never used   Substance and Sexual Activity   • Alcohol use: Not Currently   • Sexual activity: Defer          Review of Systems   Constitutional: Positive for activity change.   HENT: Negative.    Eyes: Negative.    Respiratory: Negative.    Cardiovascular: Negative.    Gastrointestinal: Negative.    Endocrine: Negative.    Genitourinary: Negative.    Musculoskeletal: Positive for arthralgias, myalgias, neck pain and neck stiffness.   Skin: Negative.    Allergic/Immunologic: Negative.    Neurological: Positive for weakness and numbness.   Hematological: Negative.    Psychiatric/Behavioral: Positive for sleep  "disturbance.        Physical Examination:     Vitals:    05/16/23 1153   BP: 140/72   BP Location: Right arm   Patient Position: Sitting   Cuff Size: Adult   Pulse: 65   Resp: 18   Weight: 120 kg (264 lb)   Height: 165.1 cm (65\")   PainSc:   6   PainLoc: Neck        Physical Exam  Eyes:      General: Lids are normal.      Extraocular Movements: Extraocular movements intact.      Pupils: Pupils are equal, round, and reactive to light.   Psychiatric:         Speech: Speech normal.          Neurological Exam  Mental Status  Awake, alert and oriented to person, place and time. Speech is normal. Language is fluent with no aphasia.    Cranial Nerves  CN II: Visual fields full to confrontation.  CN III, IV, VI: Extraocular movements intact bilaterally. Normal lids and orbits bilaterally. Pupils equal round and reactive to light bilaterally.  CN V: Facial sensation is normal.  CN VII: Full and symmetric facial movement.  CN IX, X: Palate elevates symmetrically  CN XI: Shoulder shrug strength is normal.  CN XII: Tongue midline without atrophy or fasciculations.    Motor    Full strength in the upper extremities.    Sensory  Left: Loss of sensation in the C6 dermatome.    Reflexes    Right pathological reflexes: Julian's absent.  Left pathological reflexes: Julian's absent.       Result Review  The following data was reviewed by: Charly Malave MD on 05/16/2023:    Data reviewed: Radiologic studies CT head without contrast shows a left-sided tentorial occipital region lesion with significant calcification that based on imaging alone appears to resemble a meningioma.  I do not appreciate significant mass effect or indication of parenchymal invasion.  CT of her cervical spine shows straightening and some amount of kyphosis as well as a C5-C6 disc osteophyte complex.  CT of her neck from 2019 confirms the presence of this brain lesion.  There does appear to be increased calcification since 2019 and is therefore somewhat " difficult to establish stability of the overall size.    Assessment/plan:  This is a 73-year-old woman who appears to describe chronic longstanding neck pain with intermittent left-sided radiculopathy most consistent with a C6 nerve root distribution.  CT of her cervical spine shows C5-C6 disc osteophyte complex with some focal kyphosis.  I have ordered her a flexion-extension x-ray to better evaluate for any dynamic instability in her neck as well as the curvature of her spine.  As she is unable to obtain an MRI I will order her a CT myelogram of the cervical spine.  This will help determine compressive pathology that may cause radiculopathy or myelopathy.  I will also have her obtain a CT of the brain with and without contrast in 2 years to confirm stability of this calcified lesion.  She will return to see me in approximately 6 weeks after completion of the further neck imaging.  I have encouraged her to call with any questions or concerns.    Diagnoses and all orders for this visit:    1. Cervical radiculopathy at C6 (Primary)  -     XR spine cervical ap and lat w flex and ext; Future  -     IR Myelogram Cervical Spine; Future    2. Meningioma  -     Cancel: CT Head With & Without Contrast; Future  -     CT Head With & Without Contrast; Future         Return in about 6 weeks (around 6/27/2023).            Charly Malave MD

## 2023-05-16 ENCOUNTER — HOSPITAL ENCOUNTER (OUTPATIENT)
Dept: GENERAL RADIOLOGY | Facility: HOSPITAL | Age: 73
Discharge: HOME | End: 2023-05-16
Admitting: NEUROLOGICAL SURGERY
Payer: MEDICARE

## 2023-05-16 ENCOUNTER — OFFICE VISIT (OUTPATIENT)
Dept: NEUROSURGERY | Facility: CLINIC | Age: 73
End: 2023-05-16
Payer: MEDICARE

## 2023-05-16 VITALS
WEIGHT: 264 LBS | HEIGHT: 65 IN | BODY MASS INDEX: 43.99 KG/M2 | RESPIRATION RATE: 18 BRPM | HEART RATE: 65 BPM | DIASTOLIC BLOOD PRESSURE: 72 MMHG | SYSTOLIC BLOOD PRESSURE: 140 MMHG

## 2023-05-16 DIAGNOSIS — M54.12 CERVICAL RADICULOPATHY AT C6: ICD-10-CM

## 2023-05-16 DIAGNOSIS — M54.12 CERVICAL RADICULOPATHY AT C6: Primary | ICD-10-CM

## 2023-05-16 DIAGNOSIS — D32.9 MENINGIOMA: ICD-10-CM

## 2023-05-16 PROCEDURE — 72050 X-RAY EXAM NECK SPINE 4/5VWS: CPT

## 2023-05-16 RX ORDER — GABAPENTIN 300 MG/1
CAPSULE ORAL
COMMUNITY
Start: 2023-04-07

## 2023-05-16 RX ORDER — ERGOCALCIFEROL 1.25 MG/1
CAPSULE ORAL
COMMUNITY
Start: 2023-01-05

## 2023-06-12 ENCOUNTER — HOSPITAL ENCOUNTER (OUTPATIENT)
Dept: CT IMAGING | Facility: HOSPITAL | Age: 73
Discharge: HOME | End: 2023-06-12
Payer: MEDICARE

## 2023-06-12 ENCOUNTER — HOSPITAL ENCOUNTER (OUTPATIENT)
Dept: GENERAL RADIOLOGY | Facility: HOSPITAL | Age: 73
Discharge: HOME | End: 2023-06-12
Payer: MEDICARE

## 2023-06-12 VITALS
DIASTOLIC BLOOD PRESSURE: 60 MMHG | RESPIRATION RATE: 16 BRPM | HEIGHT: 65 IN | BODY MASS INDEX: 38.32 KG/M2 | SYSTOLIC BLOOD PRESSURE: 149 MMHG | HEART RATE: 58 BPM | TEMPERATURE: 97.6 F | WEIGHT: 230 LBS | OXYGEN SATURATION: 93 %

## 2023-06-12 DIAGNOSIS — M54.12 CERVICAL RADICULOPATHY AT C6: ICD-10-CM

## 2023-06-12 LAB
APTT PPP: 25.9 SECONDS (ref 24–31)
BASOPHILS # BLD AUTO: 0.1 10*3/MM3 (ref 0–0.2)
BASOPHILS NFR BLD AUTO: 0.5 % (ref 0–1.5)
DEPRECATED RDW RBC AUTO: 47.7 FL (ref 37–54)
EOSINOPHIL # BLD AUTO: 0.3 10*3/MM3 (ref 0–0.4)
EOSINOPHIL NFR BLD AUTO: 2.1 % (ref 0.3–6.2)
ERYTHROCYTE [DISTWIDTH] IN BLOOD BY AUTOMATED COUNT: 14.3 % (ref 12.3–15.4)
HCT VFR BLD AUTO: 43.5 % (ref 34–46.6)
HGB BLD-MCNC: 14.4 G/DL (ref 12–15.9)
INR PPP: <0.93 (ref 0.93–1.1)
LYMPHOCYTES # BLD AUTO: 3.4 10*3/MM3 (ref 0.7–3.1)
LYMPHOCYTES NFR BLD AUTO: 21.2 % (ref 19.6–45.3)
MCH RBC QN AUTO: 29.6 PG (ref 26.6–33)
MCHC RBC AUTO-ENTMCNC: 33 G/DL (ref 31.5–35.7)
MCV RBC AUTO: 89.9 FL (ref 79–97)
MONOCYTES # BLD AUTO: 1.3 10*3/MM3 (ref 0.1–0.9)
MONOCYTES NFR BLD AUTO: 7.9 % (ref 5–12)
NEUTROPHILS NFR BLD AUTO: 11 10*3/MM3 (ref 1.7–7)
NEUTROPHILS NFR BLD AUTO: 68.3 % (ref 42.7–76)
NRBC BLD AUTO-RTO: 0 /100 WBC (ref 0–0.2)
PLATELET # BLD AUTO: 314 10*3/MM3 (ref 140–450)
PMV BLD AUTO: 9.3 FL (ref 6–12)
PROTHROMBIN TIME: 9.9 SECONDS (ref 9.6–11.7)
RBC # BLD AUTO: 4.85 10*6/MM3 (ref 3.77–5.28)
WBC NRBC COR # BLD: 16.1 10*3/MM3 (ref 3.4–10.8)

## 2023-06-12 PROCEDURE — 72240 MYELOGRAPHY NECK SPINE: CPT

## 2023-06-12 PROCEDURE — 62302 MYELOGRAPHY LUMBAR INJECTION: CPT

## 2023-06-12 PROCEDURE — 85730 THROMBOPLASTIN TIME PARTIAL: CPT | Performed by: RADIOLOGY

## 2023-06-12 PROCEDURE — 85025 COMPLETE CBC W/AUTO DIFF WBC: CPT | Performed by: RADIOLOGY

## 2023-06-12 PROCEDURE — 77002 NEEDLE LOCALIZATION BY XRAY: CPT

## 2023-06-12 PROCEDURE — 85610 PROTHROMBIN TIME: CPT | Performed by: RADIOLOGY

## 2023-06-12 PROCEDURE — 72126 CT NECK SPINE W/DYE: CPT

## 2023-06-12 PROCEDURE — 25510000001 IOPAMIDOL 61 % SOLUTION: Performed by: NEUROLOGICAL SURGERY

## 2023-06-12 RX ORDER — SODIUM CHLORIDE 0.9 % (FLUSH) 0.9 %
10 SYRINGE (ML) INJECTION AS NEEDED
Status: DISCONTINUED | OUTPATIENT
Start: 2023-06-12 | End: 2023-06-13 | Stop reason: HOSPADM

## 2023-06-12 RX ORDER — SODIUM CHLORIDE 0.9 % (FLUSH) 0.9 %
10 SYRINGE (ML) INJECTION EVERY 12 HOURS SCHEDULED
Status: DISCONTINUED | OUTPATIENT
Start: 2023-06-12 | End: 2023-06-13 | Stop reason: HOSPADM

## 2023-06-12 RX ADMIN — IOPAMIDOL 9 ML: 612 INJECTION, SOLUTION INTRATHECAL at 11:41

## 2023-06-12 NOTE — DISCHARGE INSTRUCTIONS
A responsible adult should stay with you and you should rest for the remainder of the day.  Do not drink alcohol, drive or cook for 24 hours following your procedure.  Progress your diet as tolerated over the next 24 hours.  You may resume all your regular medications except for Celexa and Glucophage, which you can resume tomorrow.  You may remove your bandaid in 24 hours. A small amount of blood is to be expected, do not be alarmed. If you feel it is bleeding excessively, apply pressure and proceed to the nearest Emergency Department.  You may shower after the dressing is removed.  No lifting more than 10 lbs for 24 hours. Keep your head elevated at least 30 degrees over the next 24 hours.  Increase your fluid intake over the next 24 hours.  If severe pain or headache, shortness of air or irregular heartbeat occur, seek immediate medical attention.  Follow up with Dr. Malave taking your disc to your appointment with him.

## 2023-06-12 NOTE — POST-PROCEDURE NOTE
IR POST OP NOTE    Procedure:C myelogram      Pre Op DX:Cervical radiculopathy      Post Op DX:same      Anesthesia: Local      Findings:See dictation      Complications:None immediate      Provider Signature: Dr. Dick Montiel

## 2023-09-07 ENCOUNTER — LAB (OUTPATIENT)
Dept: LAB | Facility: HOSPITAL | Age: 73
End: 2023-09-07
Payer: MEDICARE

## 2023-09-07 ENCOUNTER — HOSPITAL ENCOUNTER (OUTPATIENT)
Dept: CARDIOLOGY | Facility: HOSPITAL | Age: 73
Discharge: HOME | End: 2023-09-07
Payer: MEDICARE

## 2023-09-07 ENCOUNTER — TRANSCRIBE ORDERS (OUTPATIENT)
Dept: ADMINISTRATIVE | Facility: HOSPITAL | Age: 73
End: 2023-09-07
Payer: MEDICARE

## 2023-09-07 DIAGNOSIS — Z01.818 PRE-OP EXAMINATION: ICD-10-CM

## 2023-09-07 DIAGNOSIS — Z01.818 PRE-OP EXAMINATION: Primary | ICD-10-CM

## 2023-09-07 LAB
ABO GROUP BLD: NORMAL
ANION GAP SERPL CALCULATED.3IONS-SCNC: 11.3 MMOL/L (ref 5–15)
BASOPHILS # BLD AUTO: 0.06 10*3/MM3 (ref 0–0.2)
BASOPHILS NFR BLD AUTO: 0.6 % (ref 0–1.5)
BLD GP AB SCN SERPL QL: NEGATIVE
BUN SERPL-MCNC: 11 MG/DL (ref 8–23)
BUN/CREAT SERPL: 13.3 (ref 7–25)
CALCIUM SPEC-SCNC: 9.4 MG/DL (ref 8.6–10.5)
CHLORIDE SERPL-SCNC: 102 MMOL/L (ref 98–107)
CO2 SERPL-SCNC: 26.7 MMOL/L (ref 22–29)
CREAT SERPL-MCNC: 0.83 MG/DL (ref 0.57–1)
DEPRECATED RDW RBC AUTO: 40 FL (ref 37–54)
EGFRCR SERPLBLD CKD-EPI 2021: 73.6 ML/MIN/1.73
EOSINOPHIL # BLD AUTO: 0.32 10*3/MM3 (ref 0–0.4)
EOSINOPHIL NFR BLD AUTO: 3.3 % (ref 0.3–6.2)
ERYTHROCYTE [DISTWIDTH] IN BLOOD BY AUTOMATED COUNT: 12.1 % (ref 12.3–15.4)
GLUCOSE SERPL-MCNC: 133 MG/DL (ref 65–99)
HCT VFR BLD AUTO: 39.7 % (ref 34–46.6)
HGB BLD-MCNC: 13 G/DL (ref 12–15.9)
IMM GRANULOCYTES # BLD AUTO: 0.05 10*3/MM3 (ref 0–0.05)
IMM GRANULOCYTES NFR BLD AUTO: 0.5 % (ref 0–0.5)
LYMPHOCYTES # BLD AUTO: 2.95 10*3/MM3 (ref 0.7–3.1)
LYMPHOCYTES NFR BLD AUTO: 30.5 % (ref 19.6–45.3)
MCH RBC QN AUTO: 29.5 PG (ref 26.6–33)
MCHC RBC AUTO-ENTMCNC: 32.7 G/DL (ref 31.5–35.7)
MCV RBC AUTO: 90.2 FL (ref 79–97)
MONOCYTES # BLD AUTO: 0.73 10*3/MM3 (ref 0.1–0.9)
MONOCYTES NFR BLD AUTO: 7.5 % (ref 5–12)
NEUTROPHILS NFR BLD AUTO: 5.56 10*3/MM3 (ref 1.7–7)
NEUTROPHILS NFR BLD AUTO: 57.6 % (ref 42.7–76)
NRBC BLD AUTO-RTO: 0 /100 WBC (ref 0–0.2)
PLATELET # BLD AUTO: 254 10*3/MM3 (ref 140–450)
PMV BLD AUTO: 11 FL (ref 6–12)
POTASSIUM SERPL-SCNC: 4.1 MMOL/L (ref 3.5–5.2)
QT INTERVAL: 437 MS
QTC INTERVAL: 429 MS
RBC # BLD AUTO: 4.4 10*6/MM3 (ref 3.77–5.28)
RH BLD: POSITIVE
SODIUM SERPL-SCNC: 140 MMOL/L (ref 136–145)
T&S EXPIRATION DATE: NORMAL
WBC NRBC COR # BLD: 9.67 10*3/MM3 (ref 3.4–10.8)

## 2023-09-07 PROCEDURE — 86901 BLOOD TYPING SEROLOGIC RH(D): CPT

## 2023-09-07 PROCEDURE — 86900 BLOOD TYPING SEROLOGIC ABO: CPT

## 2023-09-07 PROCEDURE — 36415 COLL VENOUS BLD VENIPUNCTURE: CPT

## 2023-09-07 PROCEDURE — 93005 ELECTROCARDIOGRAM TRACING: CPT | Performed by: ORTHOPAEDIC SURGERY

## 2023-09-07 PROCEDURE — 86850 RBC ANTIBODY SCREEN: CPT

## 2023-09-07 PROCEDURE — 80048 BASIC METABOLIC PNL TOTAL CA: CPT

## 2023-09-07 PROCEDURE — 85025 COMPLETE CBC W/AUTO DIFF WBC: CPT

## 2023-09-25 LAB
QT INTERVAL: 437 MS
QTC INTERVAL: 429 MS

## 2024-04-12 ENCOUNTER — HOSPITAL ENCOUNTER (EMERGENCY)
Facility: HOSPITAL | Age: 74
Discharge: HOME | End: 2024-04-12
Attending: EMERGENCY MEDICINE
Payer: MEDICARE

## 2024-04-12 ENCOUNTER — APPOINTMENT (OUTPATIENT)
Dept: CT IMAGING | Facility: HOSPITAL | Age: 74
End: 2024-04-12
Payer: MEDICARE

## 2024-04-12 VITALS
BODY MASS INDEX: 37.74 KG/M2 | OXYGEN SATURATION: 96 % | HEART RATE: 62 BPM | TEMPERATURE: 98.1 F | RESPIRATION RATE: 16 BRPM | SYSTOLIC BLOOD PRESSURE: 144 MMHG | HEIGHT: 65 IN | DIASTOLIC BLOOD PRESSURE: 84 MMHG | WEIGHT: 226.5 LBS

## 2024-04-12 DIAGNOSIS — R22.0 RIGHT FACIAL SWELLING: Primary | ICD-10-CM

## 2024-04-12 LAB
ALBUMIN SERPL-MCNC: 4 G/DL (ref 3.5–5.2)
ALBUMIN/GLOB SERPL: 1.8 G/DL
ALP SERPL-CCNC: 73 U/L (ref 39–117)
ALT SERPL W P-5'-P-CCNC: 10 U/L (ref 1–33)
ANION GAP SERPL CALCULATED.3IONS-SCNC: 9.2 MMOL/L (ref 5–15)
AST SERPL-CCNC: 11 U/L (ref 1–32)
BASOPHILS # BLD AUTO: 0.06 10*3/MM3 (ref 0–0.2)
BASOPHILS NFR BLD AUTO: 0.5 % (ref 0–1.5)
BILIRUB SERPL-MCNC: <0.2 MG/DL (ref 0–1.2)
BUN SERPL-MCNC: 13 MG/DL (ref 8–23)
BUN/CREAT SERPL: 14.3 (ref 7–25)
CALCIUM SPEC-SCNC: 8.8 MG/DL (ref 8.6–10.5)
CHLORIDE SERPL-SCNC: 104 MMOL/L (ref 98–107)
CO2 SERPL-SCNC: 25.8 MMOL/L (ref 22–29)
CREAT SERPL-MCNC: 0.91 MG/DL (ref 0.57–1)
DEPRECATED RDW RBC AUTO: 45.5 FL (ref 37–54)
EGFRCR SERPLBLD CKD-EPI 2021: 65.5 ML/MIN/1.73
EOSINOPHIL # BLD AUTO: 0.31 10*3/MM3 (ref 0–0.4)
EOSINOPHIL NFR BLD AUTO: 2.8 % (ref 0.3–6.2)
ERYTHROCYTE [DISTWIDTH] IN BLOOD BY AUTOMATED COUNT: 13.4 % (ref 12.3–15.4)
GLOBULIN UR ELPH-MCNC: 2.2 GM/DL
GLUCOSE SERPL-MCNC: 157 MG/DL (ref 65–99)
HCT VFR BLD AUTO: 40.4 % (ref 34–46.6)
HGB BLD-MCNC: 12.4 G/DL (ref 12–15.9)
IMM GRANULOCYTES # BLD AUTO: 0.08 10*3/MM3 (ref 0–0.05)
IMM GRANULOCYTES NFR BLD AUTO: 0.7 % (ref 0–0.5)
LYMPHOCYTES # BLD AUTO: 2.96 10*3/MM3 (ref 0.7–3.1)
LYMPHOCYTES NFR BLD AUTO: 26.3 % (ref 19.6–45.3)
MCH RBC QN AUTO: 28.4 PG (ref 26.6–33)
MCHC RBC AUTO-ENTMCNC: 30.7 G/DL (ref 31.5–35.7)
MCV RBC AUTO: 92.4 FL (ref 79–97)
MONOCYTES # BLD AUTO: 0.74 10*3/MM3 (ref 0.1–0.9)
MONOCYTES NFR BLD AUTO: 6.6 % (ref 5–12)
NEUTROPHILS NFR BLD AUTO: 63.1 % (ref 42.7–76)
NEUTROPHILS NFR BLD AUTO: 7.11 10*3/MM3 (ref 1.7–7)
PLATELET # BLD AUTO: 313 10*3/MM3 (ref 140–450)
PMV BLD AUTO: 9.9 FL (ref 6–12)
POTASSIUM SERPL-SCNC: 3.4 MMOL/L (ref 3.5–5.2)
PROT SERPL-MCNC: 6.2 G/DL (ref 6–8.5)
RBC # BLD AUTO: 4.37 10*6/MM3 (ref 3.77–5.28)
SODIUM SERPL-SCNC: 139 MMOL/L (ref 136–145)
WBC NRBC COR # BLD AUTO: 11.26 10*3/MM3 (ref 3.4–10.8)

## 2024-04-12 PROCEDURE — 96360 HYDRATION IV INFUSION INIT: CPT

## 2024-04-12 PROCEDURE — 80053 COMPREHEN METABOLIC PANEL: CPT | Performed by: PHYSICIAN ASSISTANT

## 2024-04-12 PROCEDURE — 99285 EMERGENCY DEPT VISIT HI MDM: CPT

## 2024-04-12 PROCEDURE — 25810000003 SODIUM CHLORIDE 0.9 % SOLUTION: Performed by: PHYSICIAN ASSISTANT

## 2024-04-12 PROCEDURE — 85025 COMPLETE CBC W/AUTO DIFF WBC: CPT | Performed by: PHYSICIAN ASSISTANT

## 2024-04-12 PROCEDURE — 70491 CT SOFT TISSUE NECK W/DYE: CPT

## 2024-04-12 PROCEDURE — 25510000001 IOPAMIDOL PER 1 ML: Performed by: EMERGENCY MEDICINE

## 2024-04-12 RX ORDER — AMOXICILLIN AND CLAVULANATE POTASSIUM 875; 125 MG/1; MG/1
1 TABLET, FILM COATED ORAL 2 TIMES DAILY
Qty: 20 TABLET | Refills: 0 | Status: SHIPPED | OUTPATIENT
Start: 2024-04-12 | End: 2024-04-22

## 2024-04-12 RX ORDER — SODIUM CHLORIDE 0.9 % (FLUSH) 0.9 %
10 SYRINGE (ML) INJECTION AS NEEDED
Status: DISCONTINUED | OUTPATIENT
Start: 2024-04-12 | End: 2024-04-13 | Stop reason: HOSPADM

## 2024-04-12 RX ADMIN — SODIUM CHLORIDE 500 ML: 9 INJECTION, SOLUTION INTRAVENOUS at 19:35

## 2024-04-12 RX ADMIN — IOPAMIDOL 100 ML: 755 INJECTION, SOLUTION INTRAVENOUS at 21:24

## 2024-04-12 NOTE — FSED PROVIDER NOTE
EMERGENCY DEPARTMENT ENCOUNTER    Room Number:  10/10  Date seen:  2024  Time seen: 18:47 EDT  PCP: Matilda Peterson APRN  Historian: Patient    Discussed/obtained information from independent historians: N/A    HPI:  Chief complaint: Swollen submandibular gland  A complete HPI/ROS/PMH/PSH/SH/FH are unobtainable due to: Nothing  Context:Sofia Chavez is a 74 y.o. female who presents to the ED with c/o swollen facial gland on the right.  Patient states she has had this 3 times in the past.  Symptoms this time began yesterday while at a .  It is gradually worsened over the past 24 hours.  Patient states her ENT told her the next time it happened he would like to get a CT scan to evaluate further.  Patient was unable to get in touch with her ENT and presents to [facility] ED in hopes of obtaining a CT scan.  There is some mild discomfort associated with the swelling.  Patient is having no trouble swallowing.  There has been no dental pain, fever, chills, cough, URI-like symptoms associated with the swelling.  The patient is here for further evaluation.    External (non-ED) record review: Patient is followed Dr. Charly Bowman/neurosurgery.  She appears to be followed for cervical radiculopathy.  She was seen 2023.  Imaging was reviewed.,  Patient was examined.  The possibility of a C4-C6 anterior cervical discectomy and fusion were discussed.  Patient was to call in the future if she would like to proceed.  If she did not want to proceed she was encouraged to continue with her pain management group.    Chronic or social conditions impacting care:    ALLERGIES  Sulfa antibiotics, Codeine, Hydrocodone-acetaminophen, Meperidine, and Tramadol    PAST MEDICAL HISTORY  Active Ambulatory Problems     Diagnosis Date Noted    Hemorrhoid 2022    Anxiety attack 2022    Breast mass 2014    Cataract 2022    Chronic headache disorder 2022    Glaucoma 2022     H/O laparoscopic adjustable gastric banding 04/18/2014    Hematoma 02/24/2014    HTN (hypertension) 11/19/2021    Leukocytosis 01/04/2022    Nontraumatic hematoma of breast 12/02/2022    Temporomandibular joint disorder 04/18/2014    Palpitations 01/06/2022    Peripheral retinal degeneration 08/02/2019    Recurrent urinary tract infection 12/02/2022    Shortness of breath 09/02/2020    Sleep apnea 12/02/2022     Resolved Ambulatory Problems     Diagnosis Date Noted    No Resolved Ambulatory Problems     Past Medical History:   Diagnosis Date    Arthritis     GERD (gastroesophageal reflux disease)     Hypertension        PAST SURGICAL HISTORY  Past Surgical History:   Procedure Laterality Date    BLADDER SURGERY      CHOLECYSTECTOMY      HYSTERECTOMY      ORTHOPEDIC SURGERY      SHOULDER ROTATOR CUFF REPAIR         FAMILY HISTORY  History reviewed. No pertinent family history.    SOCIAL HISTORY  Social History     Socioeconomic History    Marital status:    Tobacco Use    Smoking status: Never     Passive exposure: Never    Smokeless tobacco: Never   Vaping Use    Vaping status: Never Used   Substance and Sexual Activity    Alcohol use: Not Currently    Drug use: Never    Sexual activity: Defer       REVIEW OF SYSTEMS  Review of Systems    All systems reviewed and negative except for those discussed in HPI.     PHYSICAL EXAM    I have reviewed the triage vital signs and nursing notes.  Vitals:    04/12/24 1826   BP: 134/54   Pulse: 69   Resp: 18   Temp: 98.1 °F (36.7 °C)   SpO2: 97%     Physical Exam    GENERAL: WDWN female, not distressed  HENT: nares patent.  Soft tissue swelling vicinity right parotid gland.  No significant dental caries or dental tenderness or signs of abscess.  No obvious stone palpated on exam.  EYES: no scleral icterus  NECK: no ROM limitations  CV: regular rhythm, regular rate  RESPIRATORY: normal effort  ABDOMEN: soft  : deferred  MUSCULOSKELETAL: no deformity  NEURO: alert, moves  all extremities, follows commands  SKIN: warm, dry    LAB RESULTS  Recent Results (from the past 24 hour(s))   CBC Auto Differential    Collection Time: 04/12/24  7:24 PM    Specimen: Blood   Result Value Ref Range    WBC 11.26 (H) 3.40 - 10.80 10*3/mm3    RBC 4.37 3.77 - 5.28 10*6/mm3    Hemoglobin 12.4 12.0 - 15.9 g/dL    Hematocrit 40.4 34.0 - 46.6 %    MCV 92.4 79.0 - 97.0 fL    MCH 28.4 26.6 - 33.0 pg    MCHC 30.7 (L) 31.5 - 35.7 g/dL    RDW 13.4 12.3 - 15.4 %    RDW-SD 45.5 37.0 - 54.0 fl    MPV 9.9 6.0 - 12.0 fL    Platelets 313 140 - 450 10*3/mm3    Neutrophil % 63.1 42.7 - 76.0 %    Lymphocyte % 26.3 19.6 - 45.3 %    Monocyte % 6.6 5.0 - 12.0 %    Eosinophil % 2.8 0.3 - 6.2 %    Basophil % 0.5 0.0 - 1.5 %    Immature Grans % 0.7 (H) 0.0 - 0.5 %    Neutrophils, Absolute 7.11 (H) 1.70 - 7.00 10*3/mm3    Lymphocytes, Absolute 2.96 0.70 - 3.10 10*3/mm3    Monocytes, Absolute 0.74 0.10 - 0.90 10*3/mm3    Eosinophils, Absolute 0.31 0.00 - 0.40 10*3/mm3    Basophils, Absolute 0.06 0.00 - 0.20 10*3/mm3    Immature Grans, Absolute 0.08 (H) 0.00 - 0.05 10*3/mm3   Comprehensive Metabolic Panel    Collection Time: 04/12/24  8:31 PM    Specimen: Blood   Result Value Ref Range    Glucose 157 (H) 65 - 99 mg/dL    BUN 13 8 - 23 mg/dL    Creatinine 0.91 0.57 - 1.00 mg/dL    Sodium 139 136 - 145 mmol/L    Potassium 3.4 (L) 3.5 - 5.2 mmol/L    Chloride 104 98 - 107 mmol/L    CO2 25.8 22.0 - 29.0 mmol/L    Calcium 8.8 8.6 - 10.5 mg/dL    Total Protein 6.2 6.0 - 8.5 g/dL    Albumin 4.0 3.5 - 5.2 g/dL    ALT (SGPT) 10 1 - 33 U/L    AST (SGOT) 11 1 - 32 U/L    Alkaline Phosphatase 73 39 - 117 U/L    Total Bilirubin <0.2 0.0 - 1.2 mg/dL    Globulin 2.2 gm/dL    A/G Ratio 1.8 g/dL    BUN/Creatinine Ratio 14.3 7.0 - 25.0    Anion Gap 9.2 5.0 - 15.0 mmol/L    eGFR 65.5 >60.0 mL/min/1.73       Ordered the above labs and independently interpreted results.  My findings will be discussed in the ED course or medical decision making  section below    RADIOLOGY RESULTS  CT Soft Tissue Neck With Contrast    Result Date: 4/12/2024  CT SOFT TISSUE NECK W CONTRAST Date of Exam: 4/12/2024 9:06 PM EDT Indication: Right-sided facial swelling.  Eval for right-sided facial abscess and salivary stone.. Comparison: Study was correlated with thyroid ultrasound performed on March 13, 2024 Technique: Axial CT images were obtained of the neck after the uneventful intravenous administration of iodinated contrast.  Sagittal and coronal reconstructions were performed.  Automated exposure control and iterative reconstruction methods were used. Findings: Streak artifact from dental hardware limits evaluation. There is no evidence of a neck mass or lymphadenopathy. A BB is visualized on the right side marking the area of concern. No underlying abnormality is visualized. The parotid, submandibular, and submental glands appear symmetric without evidence of mass or inflammatory changes. The thyroid gland is heterogeneous and is larger on the left. This is further evaluated on thyroid ultrasound performed on March 15, 2024. The pharyngeal mucosal, carotid, parotid, , buccal, prevertebral and retropharyngeal suprahyoid spaces of the neck appear within normal limits.  The infrahyoid neck structures appear within normal limits.  The epiglottis, aryepiglottic folds, false vocal cords, focal ligaments and subglottic airway appear within normal limits.  The hyoid bone, thyroid and cricoid cartilages appear within normal limits.  The visualized trachea and esophagus are unremarkable. Evaluation of the oral cavity is very limited due to severe streak artifact. No focal abnormality is visualized. The lung apices are clear. Superficial soft tissues appear unremarkable. Intracranial structures demonstrate an extra-axial predominant calcified lesion along the left cerebellar hemisphere and tentorium cerebelli measuring 2.5 x 1.5 cm compatible with meningioma. No adjacent  edema is visualized on CT. There is normal contrast opacification of the neck vasculature.  The orbits appear unremarkable.  Paranasal sinuses and mastoid air cells are well aerated. There are no acute osseous abnormalities or destructive bone lesions. Moderate degenerative changes are visualized.     Impression: Severe streak artifact from dental hardware limits evaluation. No evidence of sialolithiasis or sialadenitis. No inflammatory process visualized in the neck. No soft tissue abnormality visualized. Multinodular goiter. Findings compatible with a left tentorial meningioma measuring 2.5 x 1.5 cm. Electronically Signed: Selwyn Dunn MD  4/12/2024 9:42 PM EDT  Workstation ID: [id number]      Ordered the above noted radiological studies.  Independently interpreted by me.  My findings will be discussed in the medical decision section below.     PROGRESS, DATA ANALYSIS, CONSULTS AND MEDICAL DECISION MAKING    Please note that this section constitutes my independent interpretation of clinical data including lab results, radiology, EKG's.  This constitutes my independent professional opinion regarding differential diagnosis and management of this patient.  It may include any factors such as history from outside sources, review of external records, social determinants of health, management of medications, response to those treatments, and discussions with other providers.    ED Course as of 04/12/24 2157 Fri Apr 12, 2024 1846 BP: 134/54 [RC]   1846 Temp: 98.1 °F (36.7 °C) [RC]   1846 Heart Rate: 69 [RC]   1846 Resp: 18 [RC]   1846 SpO2: 97 %  RA [RC]   2145 I viewed the patient's CT soft tissue neck and I see no abscess.  The radiologist official read is as follows:IMPRESSION:  Impression:     Severe streak artifact from dental hardware limits evaluation.     No evidence of sialolithiasis or sialadenitis.     No inflammatory process visualized in the neck.     No soft tissue abnormality visualized.      Multinodular goiter.     Findings compatible with a left tentorial meningioma measuring 2.5 x 1.5 cm.      [RC]   2146 Patient aware of meningioma and multinodular goiter.  This is being followed closely by her PCP. [RC]   2153 Soft tissue swelling is in the vicinity of the right parotid gland.  I suppose this could be dental swelling as well.  Will treat with Augmentin, sour candy, dental and ENT follow-up. [RC]      ED Course User Index  [RC] Ted Hummel III, PA     Orders placed during this visit:  Orders Placed This Encounter   Procedures    CT Soft Tissue Neck With Contrast    Comprehensive Metabolic Panel    CBC Auto Differential    Insert peripheral IV    CBC & Differential    ED Acknowledgement Form Needed;            Medical Decision Making  Problems Addressed:  Right facial swelling: complicated acute illness or injury    Amount and/or Complexity of Data Reviewed  Labs: ordered.  Radiology: ordered.    Risk  Prescription drug management.      Sialolithiasis, parotiditis, facial abscess, dental abscess.  Suspect this is parotiditis.  Given that the patient's ENT wants a CT to further evaluate we will go ahead and proceed.  We can rule out stone and abscess in the process.  Will obtain CBC, BMP, CT soft tissue neck to further evaluate      DIAGNOSIS  Final diagnoses:   Right facial swelling          Medication List        New Prescriptions      amoxicillin-clavulanate 875-125 MG per tablet  Commonly known as: AUGMENTIN  Take 1 tablet by mouth 2 (Two) Times a Day for 10 days.               Where to Get Your Medications        These medications were sent to [facility][id number] [facility] [place] - [address] AT [address] - [phone] [facility] [phone] FX  [address] [facility] [place] [address]      Phone: [phone]   amoxicillin-clavulanate 875-125 MG per tablet         FOLLOW-UP  Christiano Garza MD  [address]  [place]  [address]  [phone]    Schedule an appointment as soon as possible for a visit   For further evaluation and treatment    Your dentist    Schedule an appointment as soon as possible for a visit   For further evaluation and treatment    Matilda Peterson, CAILIN  [address] [facility] [place] [address]  [phone]      As needed for all other issues        Latest Documented Vital Signs:  As of 21:57 EDT  BP- 134/54 HR- 69 Temp- 98.1 °F (36.7 °C) O2 sat- 97%    Appropriate PPE utilized throughout this patient encounter to include mask, if indicated, per current protocol. Hand hygiene was performed before donning PPE and after removal when leaving the room.    Please note that portions of this were completed with a voice recognition program.     Note Disclaimer: At [facility], we believe that sharing information builds trust and better relationships. You are receiving this note because you are receiving care at [facility] or recently visited. It is possible you will see health information before a provider has talked with you about it. This kind of information can be easy to misunderstand. To help you fully understand what it means for your health, we urge you to discuss this note with your provider.

## 2024-04-13 NOTE — DISCHARGE INSTRUCTIONS
Recommend sour candy for the next 3 to 5 days or until resolved symptoms.  Take the antibiotics prescribed.  Recommend follow-up with your dentist as well as your ENT for repeat evaluation.  Return to the ER with worsening symptoms or should there be any further concerns.

## 2024-04-23 ENCOUNTER — LAB (OUTPATIENT)
Dept: LAB | Facility: HOSPITAL | Age: 74
End: 2024-04-23
Payer: MEDICARE

## 2024-04-23 ENCOUNTER — OFFICE VISIT (OUTPATIENT)
Dept: ENDOCRINOLOGY | Facility: CLINIC | Age: 74
End: 2024-04-23
Payer: MEDICARE

## 2024-04-23 VITALS
BODY MASS INDEX: 37.75 KG/M2 | HEART RATE: 60 BPM | WEIGHT: 226.6 LBS | DIASTOLIC BLOOD PRESSURE: 60 MMHG | OXYGEN SATURATION: 95 % | SYSTOLIC BLOOD PRESSURE: 132 MMHG | HEIGHT: 65 IN

## 2024-04-23 DIAGNOSIS — E04.1 THYROID NODULE: Primary | ICD-10-CM

## 2024-04-23 DIAGNOSIS — E11.65 TYPE 2 DIABETES MELLITUS WITH HYPERGLYCEMIA, WITHOUT LONG-TERM CURRENT USE OF INSULIN: ICD-10-CM

## 2024-04-23 DIAGNOSIS — E04.1 THYROID NODULE: ICD-10-CM

## 2024-04-23 DIAGNOSIS — E66.01 CLASS 2 SEVERE OBESITY WITH SERIOUS COMORBIDITY AND BODY MASS INDEX (BMI) OF 37.0 TO 37.9 IN ADULT, UNSPECIFIED OBESITY TYPE: ICD-10-CM

## 2024-04-23 LAB
ALBUMIN UR-MCNC: 1.8 MG/DL
CREAT UR-MCNC: 111 MG/DL
HBA1C MFR BLD: 6.4 % (ref 4.8–5.6)
MICROALBUMIN/CREAT UR: 16.2 MG/G (ref 0–29)
T4 FREE SERPL-MCNC: 1.15 NG/DL (ref 0.93–1.7)
TSH SERPL DL<=0.05 MIU/L-ACNC: 2.59 UIU/ML (ref 0.27–4.2)

## 2024-04-23 PROCEDURE — 82570 ASSAY OF URINE CREATININE: CPT

## 2024-04-23 PROCEDURE — 84443 ASSAY THYROID STIM HORMONE: CPT

## 2024-04-23 PROCEDURE — 84439 ASSAY OF FREE THYROXINE: CPT

## 2024-04-23 PROCEDURE — 83036 HEMOGLOBIN GLYCOSYLATED A1C: CPT

## 2024-04-23 PROCEDURE — 82043 UR ALBUMIN QUANTITATIVE: CPT

## 2024-04-23 PROCEDURE — 36415 COLL VENOUS BLD VENIPUNCTURE: CPT

## 2024-04-23 RX ORDER — METFORMIN HYDROCHLORIDE 500 MG/1
500 TABLET, EXTENDED RELEASE ORAL 2 TIMES DAILY
Qty: 180 TABLET | Refills: 3 | Status: SHIPPED | OUTPATIENT
Start: 2024-04-23

## 2024-04-23 RX ORDER — LANCETS 30 GAUGE
1 EACH MISCELLANEOUS EVERY OTHER DAY
Qty: 100 EACH | Refills: 5 | Status: SHIPPED | OUTPATIENT
Start: 2024-04-23

## 2024-04-23 RX ORDER — BLOOD-GLUCOSE METER
1 KIT MISCELLANEOUS EVERY OTHER DAY
Qty: 1 EACH | Refills: 0 | Status: SHIPPED | OUTPATIENT
Start: 2024-04-23

## 2024-04-23 NOTE — PROGRESS NOTES
-----------------------------------------------------------------  ENDOCRINE CLINIC NOTE  -----------------------------------------------------------------        PATIENT NAME: Sofia Chavez  PATIENT : 1950 AGE: 74 y.o.  MRN NUMBER: [id number]  PRIMARY CARE: Matilda Peterson APRN    ==========================================================================    CHIEF COMPLAINT: Diabetes management and Thyroid Nodule  DATE OF SERVICE: 24    ==========================================================================    HPI / SUBJECTIVE    74 y.o. female is seen in the clinic today for diabetes management and thyroid nodule.    Diabetes Mellitus Hx:    -Diagnosis Date: 2020  -Last known A1c: 6.9% - 2023    -Current Therapy / Medications:  Metformin 500 mgs PO BID    -Past tried medications: (Not currently using)  None    -Home BG logs / monitoring: None    -Meals / Dietary Habits:  Two meals a day    -Last eye exam: 2024 - Dr. Noonan's office  -Neuropathy: None  -Nephropathy: CKD IIIa with frequent UTI and VALERIE, following urology for bladder issues  -No CAD or CVA    Thyroid Nodule / Goiter:    - Reported to have thyroid nodule for last 4 yrs time around  to , patient had this evaluation for thyroid nodule as an incidental finding  - Patient have a history significant for meningioma  - Previously have seen Dr. Thompson and denied any FNA in the past  - Patient possibly may have thyroid ultrasound performed in 2024 [facility]  - Denied any neck pain or swelling  - Denied expsore to radiation  - Family hx of thyroid cancer, sister  - Multiple family members with different cancers  - Denied any swallowing difficulty    ==========================================================================                                                PAST MEDICAL HISTORY    Past Medical History:   Diagnosis Date    Arthritis     Cataract     Diabetes mellitus type I 2016    Unsure     GERD (gastroesophageal reflux disease)     Goiter ?    Hyperlipidemia 2008    Hypertension     Type 2 diabetes mellitus 2016    Unsure    Vitamin D deficiency 2020       ==========================================================================    PAST SURGICAL HISTORY    Past Surgical History:   Procedure Laterality Date    BACK SURGERY      BLADDER SURGERY      CHOLECYSTECTOMY      GASTRIC RESTRICTION SURGERY  2009    Lapband    HYSTERECTOMY      KNEE SURGERY Left     LAPAROSCOPIC GASTRIC BANDING      ORTHOPEDIC SURGERY      PARTIAL KNEE ARTHROPLASTY      SHOULDER ROTATOR CUFF REPAIR         ==========================================================================    FAMILY HISTORY    Family History   Problem Relation Age of Onset    Hypertension Mother     Cancer Mother         Oral colon breast    Hypertension Father     Thyroid disease Sister     Cancer Sister         Breast.  Thyroid       ==========================================================================    SOCIAL HISTORY    Social History     Socioeconomic History    Marital status:    Tobacco Use    Smoking status: Former     Current packs/day: 0.00     Average packs/day: 2.0 packs/day for 16.5 years (32.9 ttl pk-yrs)     Types: Cigarettes     Start date: 2/10/1964     Quit date: 1980     Years since quittin.7     Passive exposure: Never    Smokeless tobacco: Never   Vaping Use    Vaping status: Never Used   Substance and Sexual Activity    Alcohol use: Not Currently    Drug use: Never    Sexual activity: Not Currently     Birth control/protection: Same-sex partner       ==========================================================================    MEDICATIONS      Current Outpatient Medications:     benzonatate (TESSALON) 200 MG capsule, Take 1 capsule by mouth 3 (Three) Times a Day As Needed for Cough., Disp: 15 capsule, Rfl: 0    citalopram (CeleXA) 40 MG tablet, Take 1 tablet by mouth Daily., Disp: , Rfl:      darifenacin (ENABLEX) 7.5 MG 24 hr tablet, Take 2 tablets by mouth Every Other Day., Disp: , Rfl:     ergocalciferol (ERGOCALCIFEROL) 1.25 MG (21731 UT) capsule, , Disp: , Rfl:     gabapentin (NEURONTIN) 300 MG capsule, , Disp: , Rfl:     melatonin 5 MG tablet tablet, 2 tablets., Disp: , Rfl:     Mirabegron ER (MYRBETRIQ) 50 MG tablet sustained-release 24 hour 24 hr tablet, Take 50 mg by mouth Daily., Disp: , Rfl:     olmesartan-hydrochlorothiazide (BENICAR HCT) 40-12.5 MG per tablet, Take 1 tablet by mouth Daily., Disp: , Rfl:     pravastatin (PRAVACHOL) 40 MG tablet, Take 2 tablets by mouth Daily., Disp: , Rfl:     propranolol LA (INDERAL LA) 120 MG 24 hr capsule, Take 1 capsule by mouth Daily., Disp: , Rfl:     tiZANidine (ZANAFLEX) 4 MG tablet, Take 1 tablet by mouth. 1 tablet daily, Disp: , Rfl:     nitrofurantoin (MACRODANTIN) 100 MG capsule, Take 1 capsule by mouth 4 (Four) Times a Day. (Patient not taking: Reported on 4/23/2024), Disp: , Rfl:     ==========================================================================    ALLERGIES    Allergies   Allergen Reactions    Sulfa Antibiotics Dizziness    Codeine Other (See Comments)     pt tolerates norco, but starts w/half tab if needed      Hydrocodone-Acetaminophen Other (See Comments)    Meperidine Anxiety and Other (See Comments)    Tramadol Other (See Comments)       ==========================================================================    OBJECTIVE    Vitals:    04/23/24 1045   BP: 132/60   Pulse: 60   SpO2: 95%     Body mass index is 37.71 kg/m².     General: Alert, cooperative, no acute distress  Thyroid:  no enlargement/tenderness/palpable nodules  Lungs: Clear to auscultation bilaterally, respirations unlabored  Heart: Regular rate and rhythm, S1 and S2 normal, no murmur, rub or gallop  Abdomen: Soft, NT, ND and Bowel sounds Positive  Extremities:  Extremities normal, atraumatic, no cyanosis or  "edema    ==========================================================================    LAB EVALUATION    Lab Results   Component Value Date    GLUCOSE 157 (H) 04/12/2024    BUN 13 04/12/2024    CREATININE 0.91 04/12/2024    BCR 14.3 04/12/2024    K 3.4 (L) 04/12/2024    CO2 25.8 04/12/2024    CALCIUM 8.8 04/12/2024    ALBUMIN 4.0 04/12/2024    AST 11 04/12/2024    ALT 10 04/12/2024     No results found for: \"HGBA1C\"  Lab Results   Component Value Date    CREATININE 0.91 04/12/2024     No results found for: \"TSH\", \"FREET4\", \"THYROIDAB\"    ==========================================================================    US Thyroid:    3/13/2024:    Left thyroid lobe: 3.8 x 2.5 x 2.9 cm  Right thyroid lobe: 3.9 x 1.4 x 2 cm  Isthmus 3 mm in thickness  Benign spongiform nodule in the right superior thyroid laterally measuring 8 x 7 x 5 mm.    Significant thyroid nodule:  Location: Left mid and central  Size: 3.2 x 2.4 x 3.1 cm  Composition: Mixed cystic and solid  Echogenicity: Hypoechoic  Shape: Wider than tall  Overall TI-RADS 3    ==========================================================================    ASSESSMENT AND PLAN    # Thyroid nodule  - Discussed with patient in detail about findings of ultrasound thyroid  - There is no palpable thyroid on the exam  - Given significant family history of thyroid disorder and TI-RADS 3 nodule with dimension more than 1.5 cm we will plan for FNA biopsy of the left thyroid nodule  - Further evaluation according to FNA results    # Type 2 diabetes with hyperglycemia  # Obesity with BMI 37.71  - Discussed with patient in detail about diagnosis and management of type 2 diabetes along with pathophysiology to which she verbalized understanding  - Patient is currently maintained on metformin therapy, tolerating without any side effect  - Patient is not a good candidate for SGLT2 inhibitor therapy due to recurrent dehydration and UTIs  - Patient is currently being evaluated for thyroid " "nodule therefore will avoid any GLP-1 receptor agonist therapy  - Discussed with patient in detail and start patient on DPP 4 inhibitor therapy, benefit and side effect of therapy discussed with patient in detail to which verbalized understanding  - New adjusted therapy:  Metformin 500 mg twice a day  Januvia 50 mg once a day  - Patient to check blood sugars 2-3 times a week and maintain logs    Thank you for courtesy of consultation.    Return to clinic: 3 months - blood work today and before next visit    Entire assessment and plan was discussed and counseled the patient in detail to which patient verbalized understanding and agreed with care.  Answered all queries and concerns.    This note was created using voice recognition software and is inherently subject to errors including those of syntax and \"sound-alike\" substitutions which may escape proofreading.  In such instances, original meaning may be extrapolated by contextual derivation.    Note: Portions of this note may have been copied from previous notes but documentation have been reviewed and edited as necessary to support clinical decision making for today's visit.    ==========================================================================    INFORMATION PROVIDED TO PATIENT    Patient Instructions   Please,    # Diabetes management:  - Continue metformin 500 mg 1 pill by mouth twice a day  - Start Januvia 50 mg 1 pill by mouth daily  - Check your blood sugar 2-3 times a week in a fasting state and maintain logs for your blood sugar number    # Thyroid nodule:  - Please plan for thyroid nodule biopsy  - Depending on to the biopsy results we will plan for further evaluation and treatment    Get blood work done today and repeat blood work again before next visit.    Follow-up in clinic back again in 3 months time.    Thank you for your visit today.    If you have any questions or concerns please feel free to reach out of the office. "       ==========================================================================  Hank Brand MD  Department of Endocrine, Diabetes and Metabolism  [facility]  [place], [place]  ==========================================================================

## 2024-04-23 NOTE — PATIENT INSTRUCTIONS
Please,    # Diabetes management:  - Continue metformin 500 mg 1 pill by mouth twice a day  - Start Januvia 50 mg 1 pill by mouth daily  - Check your blood sugar 2-3 times a week in a fasting state and maintain logs for your blood sugar number    # Thyroid nodule:  - Please plan for thyroid nodule biopsy  - Depending on to the biopsy results we will plan for further evaluation and treatment    Get blood work done today and repeat blood work again before next visit.    Follow-up in clinic back again in 3 months time.    Thank you for your visit today.    If you have any questions or concerns please feel free to reach out of the office.

## 2024-05-09 ENCOUNTER — HOSPITAL ENCOUNTER (OUTPATIENT)
Dept: ULTRASOUND IMAGING | Facility: HOSPITAL | Age: 74
Discharge: HOME | End: 2024-05-09
Payer: MEDICARE

## 2024-05-09 DIAGNOSIS — E04.1 THYROID NODULE: ICD-10-CM

## 2024-05-09 PROCEDURE — 88305 TISSUE EXAM BY PATHOLOGIST: CPT | Performed by: INTERNAL MEDICINE

## 2024-05-09 PROCEDURE — 88173 CYTOPATH EVAL FNA REPORT: CPT | Performed by: INTERNAL MEDICINE

## 2024-05-09 PROCEDURE — 25010000002 LIDOCAINE 1 % SOLUTION: Performed by: INTERNAL MEDICINE

## 2024-05-09 RX ORDER — LIDOCAINE HYDROCHLORIDE 10 MG/ML
10 INJECTION, SOLUTION INFILTRATION; PERINEURAL ONCE
Status: COMPLETED | OUTPATIENT
Start: 2024-05-09 | End: 2024-05-09

## 2024-05-09 RX ADMIN — LIDOCAINE HYDROCHLORIDE 10 ML: 10 INJECTION, SOLUTION INFILTRATION; PERINEURAL at 12:26

## 2024-05-10 LAB
LAB AP CASE REPORT: NORMAL
PATH REPORT.FINAL DX SPEC: NORMAL
PATH REPORT.GROSS SPEC: NORMAL

## 2024-07-23 ENCOUNTER — LAB (OUTPATIENT)
Dept: LAB | Facility: HOSPITAL | Age: 74
End: 2024-07-23
Payer: MEDICARE

## 2024-07-23 DIAGNOSIS — E04.1 THYROID NODULE: ICD-10-CM

## 2024-07-23 DIAGNOSIS — E11.65 TYPE 2 DIABETES MELLITUS WITH HYPERGLYCEMIA, WITHOUT LONG-TERM CURRENT USE OF INSULIN: ICD-10-CM

## 2024-07-23 LAB
ALBUMIN SERPL-MCNC: 4.1 G/DL (ref 3.5–5.2)
ALBUMIN/GLOB SERPL: 1.5 G/DL
ALP SERPL-CCNC: 52 U/L (ref 39–117)
ALT SERPL W P-5'-P-CCNC: 9 U/L (ref 1–33)
ANION GAP SERPL CALCULATED.3IONS-SCNC: 11 MMOL/L (ref 5–15)
AST SERPL-CCNC: 15 U/L (ref 1–32)
BILIRUB SERPL-MCNC: 0.3 MG/DL (ref 0–1.2)
BUN SERPL-MCNC: 17 MG/DL (ref 8–23)
BUN/CREAT SERPL: 16.2 (ref 7–25)
CALCIUM SPEC-SCNC: 9.7 MG/DL (ref 8.6–10.5)
CHLORIDE SERPL-SCNC: 99 MMOL/L (ref 98–107)
CO2 SERPL-SCNC: 27 MMOL/L (ref 22–29)
CREAT SERPL-MCNC: 1.05 MG/DL (ref 0.57–1)
EGFRCR SERPLBLD CKD-EPI 2021: 55.2 ML/MIN/1.73
GLOBULIN UR ELPH-MCNC: 2.7 GM/DL
GLUCOSE SERPL-MCNC: 162 MG/DL (ref 65–99)
HBA1C MFR BLD: 6.2 % (ref 4.8–5.6)
POTASSIUM SERPL-SCNC: 4.2 MMOL/L (ref 3.5–5.2)
PROT SERPL-MCNC: 6.8 G/DL (ref 6–8.5)
SODIUM SERPL-SCNC: 137 MMOL/L (ref 136–145)
T4 FREE SERPL-MCNC: 1.32 NG/DL (ref 0.92–1.68)
TSH SERPL DL<=0.05 MIU/L-ACNC: 2.24 UIU/ML (ref 0.27–4.2)

## 2024-07-23 PROCEDURE — 84439 ASSAY OF FREE THYROXINE: CPT

## 2024-07-23 PROCEDURE — 83036 HEMOGLOBIN GLYCOSYLATED A1C: CPT

## 2024-07-23 PROCEDURE — 84443 ASSAY THYROID STIM HORMONE: CPT

## 2024-07-23 PROCEDURE — 36415 COLL VENOUS BLD VENIPUNCTURE: CPT

## 2024-07-23 PROCEDURE — 80053 COMPREHEN METABOLIC PANEL: CPT

## 2024-07-30 ENCOUNTER — OFFICE VISIT (OUTPATIENT)
Dept: ENDOCRINOLOGY | Facility: CLINIC | Age: 74
End: 2024-07-30
Payer: MEDICARE

## 2024-07-30 VITALS
BODY MASS INDEX: 37.99 KG/M2 | DIASTOLIC BLOOD PRESSURE: 72 MMHG | OXYGEN SATURATION: 94 % | SYSTOLIC BLOOD PRESSURE: 112 MMHG | HEART RATE: 63 BPM | HEIGHT: 65 IN | WEIGHT: 228 LBS

## 2024-07-30 DIAGNOSIS — E04.1 THYROID NODULE: ICD-10-CM

## 2024-07-30 DIAGNOSIS — E66.01 CLASS 2 SEVERE OBESITY WITH SERIOUS COMORBIDITY AND BODY MASS INDEX (BMI) OF 37.0 TO 37.9 IN ADULT, UNSPECIFIED OBESITY TYPE: ICD-10-CM

## 2024-07-30 DIAGNOSIS — N18.31 STAGE 3A CHRONIC KIDNEY DISEASE: ICD-10-CM

## 2024-07-30 DIAGNOSIS — E11.65 TYPE 2 DIABETES MELLITUS WITH HYPERGLYCEMIA, WITHOUT LONG-TERM CURRENT USE OF INSULIN: Primary | ICD-10-CM

## 2024-07-30 LAB — GLUCOSE BLDC GLUCOMTR-MCNC: 118 MG/DL (ref 70–105)

## 2024-07-30 PROCEDURE — 82948 REAGENT STRIP/BLOOD GLUCOSE: CPT | Performed by: INTERNAL MEDICINE

## 2024-07-30 PROCEDURE — G2211 COMPLEX E/M VISIT ADD ON: HCPCS | Performed by: INTERNAL MEDICINE

## 2024-07-30 PROCEDURE — 1159F MED LIST DOCD IN RCRD: CPT | Performed by: INTERNAL MEDICINE

## 2024-07-30 PROCEDURE — 1160F RVW MEDS BY RX/DR IN RCRD: CPT | Performed by: INTERNAL MEDICINE

## 2024-07-30 PROCEDURE — 99214 OFFICE O/P EST MOD 30 MIN: CPT | Performed by: INTERNAL MEDICINE

## 2024-07-30 PROCEDURE — 3074F SYST BP LT 130 MM HG: CPT | Performed by: INTERNAL MEDICINE

## 2024-07-30 PROCEDURE — 3078F DIAST BP <80 MM HG: CPT | Performed by: INTERNAL MEDICINE

## 2024-07-30 RX ORDER — MELOXICAM 7.5 MG/1
TABLET ORAL
COMMUNITY
Start: 2024-06-19

## 2024-07-30 RX ORDER — OLMESARTAN MEDOXOMIL, AMLODIPINE AND HYDROCHLOROTHIAZIDE TABLET 20/5/12.5 MG 20; 5; 12.5 MG/1; MG/1; MG/1
TABLET ORAL
COMMUNITY
Start: 2024-05-13

## 2024-07-30 NOTE — PROGRESS NOTES
-----------------------------------------------------------------  ENDOCRINE CLINIC NOTE  -----------------------------------------------------------------        PATIENT NAME: Sofia Chavez  PATIENT : 1950 AGE: 74 y.o.  MRN NUMBER: [id number]  PRIMARY CARE: Matilda Peterson APRN    ==========================================================================    CHIEF COMPLAINT: Diabetes management and Thyroid Nodule  DATE OF SERVICE: 24    ==========================================================================    HPI / SUBJECTIVE    74 y.o. female is seen in the clinic today for diabetes management and thyroid nodule.    Diabetes Mellitus Hx:  Diagnosis Date: 2020  Current Therapy / Medications:  Metformin 500 mg twice a day  Januvia 50 mg once a day  Past tried medications: (Not currently using) None  Home BG logs / monitoring: Twice a week  Meals / Dietary Habits: Two meals a day  Last eye exam: 2024 - Dr. Noonan's office  Neuropathy: None  Nephropathy: CKD IIIa with frequent UTI and VALERIE, following urology for bladder issues  No CAD or CVA    Thyroid Nodule / Goiter:    - Reported to have thyroid nodule for last 4 yrs time around  to , patient had this evaluation for thyroid nodule as an incidental finding  - Previously have seen Dr. Mcdonnell   - [facility] and FNA done since last visit in May 2024 and no malignancy identified    ==========================================================================                                                PAST MEDICAL HISTORY    Past Medical History:   Diagnosis Date    Arthritis     Cataract     Diabetes mellitus type I 2016    Unsure    GERD (gastroesophageal reflux disease)     Goiter ?    Hyperlipidemia 2008    Hypertension     Type 2 diabetes mellitus 2016    Unsure    Vitamin D deficiency 2020       ==========================================================================    PAST  SURGICAL HISTORY    Past Surgical History:   Procedure Laterality Date    BACK SURGERY      BLADDER SURGERY      CHOLECYSTECTOMY      GASTRIC RESTRICTION SURGERY  2009    Lapband    HYSTERECTOMY      KNEE SURGERY Left     LAPAROSCOPIC GASTRIC BANDING      ORTHOPEDIC SURGERY      PARTIAL KNEE ARTHROPLASTY      SHOULDER ROTATOR CUFF REPAIR         ==========================================================================    FAMILY HISTORY    Family History   Problem Relation Age of Onset    Hypertension Mother     Cancer Mother         Oral colon breast    Hypertension Father     Thyroid disease Sister     Cancer Sister         Breast.  Thyroid       ==========================================================================    SOCIAL HISTORY    Social History     Socioeconomic History    Marital status:    Tobacco Use    Smoking status: Former     Current packs/day: 0.00     Average packs/day: 2.0 packs/day for 16.5 years (32.9 ttl pk-yrs)     Types: Cigarettes     Start date: 2/10/1964     Quit date: 1980     Years since quittin.0     Passive exposure: Never    Smokeless tobacco: Never   Vaping Use    Vaping status: Never Used   Substance and Sexual Activity    Alcohol use: Not Currently    Drug use: Never    Sexual activity: Not Currently     Partners: Male     Birth control/protection: Post-menopausal, Hysterectomy, Same-sex partner       ==========================================================================    MEDICATIONS      Current Outpatient Medications:     benzonatate (TESSALON) 200 MG capsule, Take 1 capsule by mouth 3 (Three) Times a Day As Needed for Cough., Disp: 15 capsule, Rfl: 0    citalopram (CeleXA) 40 MG tablet, Take 1 tablet by mouth Daily., Disp: , Rfl:     ergocalciferol (ERGOCALCIFEROL) 1.25 MG (48727 UT) capsule, , Disp: , Rfl:     gabapentin (NEURONTIN) 300 MG capsule, , Disp: , Rfl:     glucose blood test strip, 1 each by Other route Every Other Day., Disp: 100 each,  Rfl: 5    glucose monitor monitoring kit, Use 1 each Every Other Day., Disp: 1 each, Rfl: 0    Lancets misc, Use 1 each Every Other Day., Disp: 100 each, Rfl: 5    melatonin 5 MG tablet tablet, 2 tablets., Disp: , Rfl:     meloxicam (MOBIC) 7.5 MG tablet, , Disp: , Rfl:     metFORMIN ER (GLUCOPHAGE-XR) 500 MG 24 hr tablet, Take 1 tablet by mouth 2 (Two) Times a Day. Take 2 tablets p.o. daily., Disp: 180 tablet, Rfl: 3    Mirabegron ER (MYRBETRIQ) 50 MG tablet sustained-release 24 hour 24 hr tablet, Take 50 mg by mouth Daily., Disp: , Rfl:     Olmesartan-amLODIPine-HCTZ 20-5-12.5 MG tablet, , Disp: , Rfl:     olmesartan-hydrochlorothiazide (BENICAR HCT) 40-12.5 MG per tablet, Take 1 tablet by mouth Daily., Disp: , Rfl:     pravastatin (PRAVACHOL) 40 MG tablet, Take 2 tablets by mouth Daily., Disp: , Rfl:     propranolol LA (INDERAL LA) 120 MG 24 hr capsule, Take 1 capsule by mouth Daily., Disp: , Rfl:     SITagliptin (Januvia) 50 MG tablet, Take 1 tablet by mouth Daily., Disp: 30 tablet, Rfl: 5    tiZANidine (ZANAFLEX) 4 MG tablet, Take 1 tablet by mouth. 1 tablet daily, Disp: , Rfl:     ==========================================================================    ALLERGIES    Allergies   Allergen Reactions    Sulfa Antibiotics Dizziness    Codeine Other (See Comments)     pt tolerates norco, but starts w/half tab if needed      Hydrocodone-Acetaminophen Other (See Comments)    Meperidine Anxiety and Other (See Comments)    Tramadol Other (See Comments)       ==========================================================================    OBJECTIVE    Vitals:    07/30/24 1037   BP: 112/72   Pulse: 63   SpO2: 94%     Body mass index is 37.94 kg/m².     General: Alert, cooperative, no acute distress  Thyroid:  no enlargement/tenderness/palpable nodules  Lungs: Clear to auscultation bilaterally, respirations unlabored  Heart: Regular rate and rhythm, S1 and S2 normal, no murmur, rub or gallop  Abdomen: Soft, NT, ND and  Bowel sounds Positive  Extremities:  Extremities normal, atraumatic, no cyanosis or edema    ==========================================================================    LAB EVALUATION    Lab Results   Component Value Date    GLUCOSE 162 (H) 07/23/2024    BUN 17 07/23/2024    CREATININE 1.05 (H) 07/23/2024    BCR 16.2 07/23/2024    K 4.2 07/23/2024    CO2 27.0 07/23/2024    CALCIUM 9.7 07/23/2024    ALBUMIN 4.1 07/23/2024    AST 15 07/23/2024    ALT 9 07/23/2024     Lab Results   Component Value Date    HGBA1C 6.20 (H) 07/23/2024    HGBA1C 6.40 (H) 04/23/2024     Lab Results   Component Value Date    MICROALBUR 1.8 04/23/2024    CREATININE 1.05 (H) 07/23/2024     Lab Results   Component Value Date    TSH 2.240 07/23/2024    FREET4 1.32 07/23/2024       ==========================================================================    US Thyroid:    3/13/2024:    Left thyroid lobe: 3.8 x 2.5 x 2.9 cm  Right thyroid lobe: 3.9 x 1.4 x 2 cm  Isthmus 3 mm in thickness  Benign spongiform nodule in the right superior thyroid laterally measuring 8 x 7 x 5 mm.    Significant thyroid nodule:  Location: Left mid and central  Size: 3.2 x 2.4 x 3.1 cm  Composition: Mixed cystic and solid  Echogenicity: Hypoechoic  Shape: Wider than tall  Overall TI-RADS 3  FNA 5/9/2024 was benign     ==========================================================================    ASSESSMENT AND PLAN    # Thyroid nodule  - :Last US Thyroid was in March 2024 and FNA in May 2024 with benign pathology  - Repeat US Scan in one year time around May 2025    # Type 2 diabetes with hyperglycemia  # CKD IIIa  # Obesity with BMI 37.94  - Reviewed fingerstick data and blood sugar within acceptable limit  - Also A1c continues to remain within target that is less than 6.5% without any hypoglycemia  - Patient seems to be tolerating metformin and DPP 4 inhibitor therapy without any side effects  - Patient is not a good candidate for SGLT2 inhibitor therapy due to  "recurrent dehydration and UTIs  - Continue same therapy without any changes and patient to follow-up in clinic back again in 6 months time  Metformin 500 mg twice a day  Januvia 50 mg once a day  - Patient to check blood sugars 2-3 times a week and maintain logs    Return to clinic: 6 months     Entire assessment and plan was discussed and counseled the patient in detail to which patient verbalized understanding and agreed with care.  Answered all queries and concerns.    This note was created using voice recognition software and is inherently subject to errors including those of syntax and \"sound-alike\" substitutions which may escape proofreading.  In such instances, original meaning may be extrapolated by contextual derivation.    Note: Portions of this note may have been copied from previous notes but documentation have been reviewed and edited as necessary to support clinical decision making for today's visit.    ==========================================================================    INFORMATION PROVIDED TO PATIENT    Patient Instructions   Please,    # Diabetes management:  - Continue Metformin 500 mg 1 pill by mouth twice a day  - Continue Januvia 50 mg 1 pill by mouth daily  - Check your blood sugar 2-3 times a week in a fasting state and maintain logs for your blood sugar number    # Thyroid nodule:  - Will plan for Ultrasound Thyroid again in May 2025    Get blood work done today before next visit.    Follow-up in clinic back again in 6 months time.    Thank you for your visit today.    If you have any questions or concerns please feel free to reach out of the office.       ==========================================================================  Hank Brand MD  Department of Endocrine, Diabetes and Metabolism  [facility], [place]  ==========================================================================  "

## 2024-07-30 NOTE — PATIENT INSTRUCTIONS
Please,    # Diabetes management:  - Continue Metformin 500 mg 1 pill by mouth twice a day  - Continue Januvia 50 mg 1 pill by mouth daily  - Check your blood sugar 2-3 times a week in a fasting state and maintain logs for your blood sugar number    # Thyroid nodule:  - Will plan for Ultrasound Thyroid again in May 2025    Get blood work done today before next visit.    Follow-up in clinic back again in 6 months time.    Thank you for your visit today.    If you have any questions or concerns please feel free to reach out of the office.

## 2024-10-24 DIAGNOSIS — E11.65 TYPE 2 DIABETES MELLITUS WITH HYPERGLYCEMIA, WITHOUT LONG-TERM CURRENT USE OF INSULIN: ICD-10-CM

## 2024-10-25 RX ORDER — SITAGLIPTIN 50 MG/1
50 TABLET, FILM COATED ORAL DAILY
Qty: 30 TABLET | Refills: 0 | OUTPATIENT
Start: 2024-10-25

## 2024-12-28 ENCOUNTER — HOSPITAL ENCOUNTER (OUTPATIENT)
Facility: HOSPITAL | Age: 74
Discharge: HOME | End: 2024-12-28
Attending: EMERGENCY MEDICINE
Payer: MEDICARE

## 2024-12-28 ENCOUNTER — APPOINTMENT (OUTPATIENT)
Dept: GENERAL RADIOLOGY | Facility: HOSPITAL | Age: 74
End: 2024-12-28
Payer: MEDICARE

## 2024-12-28 VITALS
HEART RATE: 69 BPM | SYSTOLIC BLOOD PRESSURE: 107 MMHG | OXYGEN SATURATION: 96 % | BODY MASS INDEX: 38 KG/M2 | DIASTOLIC BLOOD PRESSURE: 38 MMHG | HEIGHT: 65 IN | RESPIRATION RATE: 17 BRPM | TEMPERATURE: 97.7 F | WEIGHT: 228.1 LBS
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
## 2024-12-28 DIAGNOSIS — S42.141A GLENOID FRACTURE OF SHOULDER, RIGHT, CLOSED, INITIAL ENCOUNTER: Primary | ICD-10-CM

## 2024-12-28 DIAGNOSIS — S42.151A GLENOID FRACTURE OF SHOULDER, RIGHT, CLOSED, INITIAL ENCOUNTER: Primary | ICD-10-CM

## 2024-12-28 PROCEDURE — G0463 HOSPITAL OUTPT CLINIC VISIT: HCPCS | Performed by: NURSE PRACTITIONER

## 2024-12-28 PROCEDURE — 73030 X-RAY EXAM OF SHOULDER: CPT

## 2024-12-28 NOTE — FSED PROVIDER NOTE
Subjective   History of Present Illness  The patient is a 74-year-old female who presents to the ER after she fell when she got out of bed this morning.  Right shoulder pain.  Patient reports that she did fall on the carpet.  Patient reports that she has limited range of motion due to pain    History provided by:  Patient   used: No        Review of Systems   Musculoskeletal:         Right shoulder pain.         Past Medical History:   Diagnosis Date    Arthritis     Cataract     Diabetes mellitus type I 2016    Unsure    GERD (gastroesophageal reflux disease)     Goiter ?    Hyperlipidemia 2008    Hypertension     Type 2 diabetes mellitus 2016    Unsure    Vitamin D deficiency 2020       Allergies   Allergen Reactions    Sulfa Antibiotics Dizziness    Codeine Other (See Comments)     pt tolerates norco, but starts w/half tab if needed      Hydrocodone-Acetaminophen Other (See Comments)    Meperidine Anxiety and Other (See Comments)    Tramadol Other (See Comments)       Past Surgical History:   Procedure Laterality Date    BACK SURGERY      BLADDER SURGERY      CHOLECYSTECTOMY      GASTRIC RESTRICTION SURGERY  2009    Lapband    HYSTERECTOMY      KNEE SURGERY Left     LAPAROSCOPIC GASTRIC BANDING      ORTHOPEDIC SURGERY      PARTIAL KNEE ARTHROPLASTY      SHOULDER ROTATOR CUFF REPAIR         Family History   Problem Relation Age of Onset    Hypertension Mother     Cancer Mother         Oral colon breast    Hypertension Father     Thyroid disease Sister     Cancer Sister         Breast.  Thyroid       Social History     Socioeconomic History    Marital status:    Tobacco Use    Smoking status: Former     Current packs/day: 0.00     Average packs/day: 2.0 packs/day for 16.5 years (32.9 ttl pk-yrs)     Types: Cigarettes     Start date: 2/10/1964     Quit date: 1980     Years since quittin.4     Passive exposure: Never    Smokeless tobacco: Never   Vaping Use    Vaping  status: Never Used   Substance and Sexual Activity    Alcohol use: Not Currently    Drug use: Never    Sexual activity: Not Currently     Partners: Male     Birth control/protection: Post-menopausal, Hysterectomy, Partner of same sex           Objective   Physical Exam  Vitals and nursing note reviewed.   Musculoskeletal:         General: Tenderness present.      Comments: Patient with right shoulder pain after she fell on the carpet this morning.  Patient has limited range of motion due to pain.  Patient has positive radial pulses.  Cap refills less than 3 seconds.  Patient does have tenderness on palpation over the shoulder.     Skin:     General: Skin is warm and dry.   Neurological:      General: No focal deficit present.      Mental Status: She is alert and oriented to person, place, and time.   Psychiatric:         Mood and Affect: Mood normal.         Behavior: Behavior normal.         Procedures           ED Course  ED Course as of 12/28/24 1816   Sat Dec 28, 2024   1802 XR SHOULDER 2+ VW RIGHT     Date of Exam: 12/28/2024 5:22 PM EST     Indication: fall today , right shoulder pain     Comparison: None available.     Findings:  Subtle osseous deformity noted through the bony glenoid.  The remainder the visualized osseous structures are intact. No dislocation.  Mild degenerative changes of the glenohumeral and AC joints.  The visualized soft tissues are unremarkable.     IMPRESSION:  Impression:  Subtle osseous deformity through the bony glenoid may represent a nondisplaced fracture.   [DS]      ED Course User Index  [DS] Ann Delcid APRN                                           Medical Decision Making  Patient with right shoulder pain after she fell on the carpet this morning.  Patient has limited range of motion due to pain.  Patient has positive radial pulses.  Cap refills less than 3 seconds.  Patient does have tenderness on palpation over the shoulder.      X-ray shows a subtle osseous  deformity through the bony glenoid which may represent a nondisplaced fracture    The Pt was found to have a possible glenoid fracture right shoulder on XR. The Pt is otherwise well appearing, hemodynamically stable, and shows no evidence of neurovascular injury or compartment syndrome. Patient was placed in arm sling and advised to follow-up with orthopedics.  Patient reports that she does have a orthopedic and surgery on her other shoulder.    Problems Addressed:  Fall, initial encounter: complicated acute illness or injury  Glenoid fracture of shoulder, right, closed, initial encounter: complicated acute illness or injury    Amount and/or Complexity of Data Reviewed  Radiology: ordered. Decision-making details documented in ED Course.        Final diagnoses:   Glenoid fracture of shoulder, right, closed, initial encounter   Fall, initial encounter       ED Disposition  ED Disposition       ED Disposition   Discharge    Condition   Stable    Comment   --               Matilda Peterson, APRN  [address] [facility] [place] [address] [phone]    Schedule an appointment as soon as possible for a visit in 1 week  As needed, If symptoms worsen         Medication List      No changes were made to your prescriptions during this visit.

## 2024-12-28 NOTE — DISCHARGE INSTRUCTIONS
Call and follow up with your orthopedic.     Your xray shows a possible subtle glenoid fracture of the right shoulder.     Tylenol/Motrin as needed for pain/fevers    Make sure patient is drinking plenty of fluids.    Wear arm sling till seen by orthopedics.     Return for any new or worsening symptoms.      Apply ice to your shoulder.     Voice recognition transcription technology was used for documentation on this chart.  Result there may be some typos and/or introduced into the chart that were overlooked during editing reviewing.

## 2025-01-14 ENCOUNTER — LAB (OUTPATIENT)
Dept: ENDOCRINOLOGY | Facility: CLINIC | Age: 75
End: 2025-01-14
Payer: MEDICARE

## 2025-01-14 DIAGNOSIS — E11.65 TYPE 2 DIABETES MELLITUS WITH HYPERGLYCEMIA, WITHOUT LONG-TERM CURRENT USE OF INSULIN: ICD-10-CM

## 2025-01-15 LAB
ALBUMIN SERPL-MCNC: 3.8 G/DL (ref 3.8–4.8)
ALBUMIN/CREAT UR: 8 MG/G CREAT (ref 0–29)
ALP SERPL-CCNC: 56 IU/L (ref 44–121)
ALT SERPL-CCNC: 9 IU/L (ref 0–32)
AST SERPL-CCNC: 13 IU/L (ref 0–40)
BILIRUB SERPL-MCNC: 0.3 MG/DL (ref 0–1.2)
BUN SERPL-MCNC: 14 MG/DL (ref 8–27)
BUN/CREAT SERPL: 13 (ref 12–28)
CALCIUM SERPL-MCNC: 9 MG/DL (ref 8.7–10.3)
CHLORIDE SERPL-SCNC: 100 MMOL/L (ref 96–106)
CO2 SERPL-SCNC: 27 MMOL/L (ref 20–29)
CREAT SERPL-MCNC: 1.07 MG/DL (ref 0.57–1)
CREAT UR-MCNC: 160.4 MG/DL
EGFRCR SERPLBLD CKD-EPI 2021: 54 ML/MIN/1.73
GLOBULIN SER CALC-MCNC: 2.2 G/DL (ref 1.5–4.5)
GLUCOSE SERPL-MCNC: 116 MG/DL (ref 70–99)
HBA1C MFR BLD: 6.6 % (ref 4.8–5.6)
MICROALBUMIN UR-MCNC: 12.7 UG/ML
POTASSIUM SERPL-SCNC: 4.4 MMOL/L (ref 3.5–5.2)
PROT SERPL-MCNC: 6 G/DL (ref 6–8.5)
SODIUM SERPL-SCNC: 139 MMOL/L (ref 134–144)

## 2025-01-21 ENCOUNTER — OFFICE VISIT (OUTPATIENT)
Dept: ENDOCRINOLOGY | Facility: CLINIC | Age: 75
End: 2025-01-21
Payer: MEDICARE

## 2025-01-21 VITALS
BODY MASS INDEX: 38.65 KG/M2 | SYSTOLIC BLOOD PRESSURE: 108 MMHG | HEIGHT: 65 IN | DIASTOLIC BLOOD PRESSURE: 58 MMHG | WEIGHT: 232 LBS | OXYGEN SATURATION: 98 % | HEART RATE: 62 BPM

## 2025-01-21 DIAGNOSIS — N18.31 STAGE 3A CHRONIC KIDNEY DISEASE: ICD-10-CM

## 2025-01-21 DIAGNOSIS — E11.65 TYPE 2 DIABETES MELLITUS WITH HYPERGLYCEMIA, WITHOUT LONG-TERM CURRENT USE OF INSULIN: Primary | ICD-10-CM

## 2025-01-21 DIAGNOSIS — E66.9 OBESITY (BMI 30-39.9): ICD-10-CM

## 2025-01-21 DIAGNOSIS — E04.1 THYROID NODULE: ICD-10-CM

## 2025-01-21 LAB — GLUCOSE BLDC GLUCOMTR-MCNC: 102 MG/DL (ref 70–105)

## 2025-01-21 PROCEDURE — 1159F MED LIST DOCD IN RCRD: CPT | Performed by: INTERNAL MEDICINE

## 2025-01-21 PROCEDURE — 99214 OFFICE O/P EST MOD 30 MIN: CPT | Performed by: INTERNAL MEDICINE

## 2025-01-21 PROCEDURE — 3078F DIAST BP <80 MM HG: CPT | Performed by: INTERNAL MEDICINE

## 2025-01-21 PROCEDURE — 82948 REAGENT STRIP/BLOOD GLUCOSE: CPT | Performed by: INTERNAL MEDICINE

## 2025-01-21 PROCEDURE — G2211 COMPLEX E/M VISIT ADD ON: HCPCS | Performed by: INTERNAL MEDICINE

## 2025-01-21 PROCEDURE — 1160F RVW MEDS BY RX/DR IN RCRD: CPT | Performed by: INTERNAL MEDICINE

## 2025-01-21 PROCEDURE — 3074F SYST BP LT 130 MM HG: CPT | Performed by: INTERNAL MEDICINE

## 2025-01-21 RX ORDER — METFORMIN HYDROCHLORIDE 500 MG/1
500 TABLET, EXTENDED RELEASE ORAL 2 TIMES DAILY
Qty: 180 TABLET | Refills: 3 | Status: SHIPPED | OUTPATIENT
Start: 2025-01-21

## 2025-01-21 NOTE — PROGRESS NOTES
-----------------------------------------------------------------  ENDOCRINE CLINIC NOTE  -----------------------------------------------------------------        PATIENT NAME: Sofia Chavez  PATIENT : 1950 AGE: 74 y.o.  MRN NUMBER: [id number]  PRIMARY CARE: Matilda Peetrson APRN    ==========================================================================    CHIEF COMPLAINT: Diabetes management and Thyroid Nodule  DATE OF SERVICE: 25    ==========================================================================    HPI / SUBJECTIVE    74 y.o. female is seen in the clinic today for diabetes management and thyroid nodule.  Since the last visit had right shoulder fracture and is currently in sling, date of fracture was Dec 28, 2024.    Diabetes Mellitus Hx:  Diagnosis Date: 2020  Current Therapy / Medications:  Metformin 500 mg twice a day  Januvia 50 mg once a day  Past tried medications: (Not currently using) None  Home BG logs / monitoring: Finger sticks in target  Meals / Dietary Habits: Two meals a day  Following Dr. Noonan's office, following Q6 months   Neuropathy: None  CKD IIIa with frequent UTI and VALERIE, following urology for bladder issues  -ve Microalbumin -ve in 2025  No CAD or CVA    Thyroid Nodule / Goiter:    - Reported to have thyroid nodule for last 4 yrs time around  to , patient had this evaluation for thyroid nodule as an incidental finding  - Previously have seen Dr. Mcdonnell   - US thyroid in 2024 at [facility] radiology and FNA done in May 2024 and no malignancy identified    ==========================================================================                                                PAST MEDICAL HISTORY    Past Medical History:   Diagnosis Date    Arthritis     Cataract     Diabetes mellitus type I 2016    Unsure    GERD (gastroesophageal reflux disease)     Goiter ?    Hyperlipidemia 2008    Hypertension     Type 2 diabetes mellitus 2016     Unsure    Vitamin D deficiency 2020       ==========================================================================    PAST SURGICAL HISTORY    Past Surgical History:   Procedure Laterality Date    BACK SURGERY      BLADDER SURGERY      CHOLECYSTECTOMY      GASTRIC RESTRICTION SURGERY  2009    Lapband    HYSTERECTOMY      KNEE SURGERY Left     LAPAROSCOPIC GASTRIC BANDING      ORTHOPEDIC SURGERY      PARTIAL KNEE ARTHROPLASTY      SHOULDER ROTATOR CUFF REPAIR         ==========================================================================    FAMILY HISTORY    Family History   Problem Relation Age of Onset    Hypertension Mother     Cancer Mother         Oral colon breast    Hypertension Father     Thyroid disease Sister     Cancer Sister         Breast.  Thyroid       ==========================================================================    SOCIAL HISTORY    Social History     Socioeconomic History    Marital status:    Tobacco Use    Smoking status: Former     Current packs/day: 0.00     Average packs/day: 2.0 packs/day for 16.5 years (32.9 ttl pk-yrs)     Types: Cigarettes     Start date: 2/10/1964     Quit date: 1980     Years since quittin.5     Passive exposure: Never    Smokeless tobacco: Never   Vaping Use    Vaping status: Never Used   Substance and Sexual Activity    Alcohol use: Not Currently    Drug use: Never    Sexual activity: Not Currently     Partners: Male     Birth control/protection: Post-menopausal, Hysterectomy, Partner of same sex       ==========================================================================    MEDICATIONS      Current Outpatient Medications:     benzonatate (TESSALON) 200 MG capsule, Take 1 capsule by mouth 3 (Three) Times a Day As Needed for Cough., Disp: 15 capsule, Rfl: 0    citalopram (CeleXA) 40 MG tablet, Take 1 tablet by mouth Daily., Disp: , Rfl:     ergocalciferol (ERGOCALCIFEROL) 1.25 MG (26846 UT) capsule, , Disp: , Rfl:     gabapentin  (NEURONTIN) 300 MG capsule, , Disp: , Rfl:     glucose blood test strip, 1 each by Other route Every Other Day., Disp: 100 each, Rfl: 5    glucose monitor monitoring kit, Use 1 each Every Other Day., Disp: 1 each, Rfl: 0    Lancets misc, Use 1 each Every Other Day., Disp: 100 each, Rfl: 5    melatonin 5 MG tablet tablet, 2 tablets., Disp: , Rfl:     meloxicam (MOBIC) 7.5 MG tablet, , Disp: , Rfl:     metFORMIN ER (GLUCOPHAGE-XR) 500 MG 24 hr tablet, Take 1 tablet by mouth 2 (Two) Times a Day. Take 2 tablets p.o. daily., Disp: 180 tablet, Rfl: 3    Mirabegron ER (MYRBETRIQ) 50 MG tablet sustained-release 24 hour 24 hr tablet, Take 50 mg by mouth Daily., Disp: , Rfl:     Olmesartan-amLODIPine-HCTZ 20-5-12.5 MG tablet, , Disp: , Rfl:     olmesartan-hydrochlorothiazide (BENICAR HCT) 40-12.5 MG per tablet, Take 1 tablet by mouth Daily., Disp: , Rfl:     pravastatin (PRAVACHOL) 40 MG tablet, Take 2 tablets by mouth Daily., Disp: , Rfl:     propranolol LA (INDERAL LA) 120 MG 24 hr capsule, Take 1 capsule by mouth Daily., Disp: , Rfl:     SITagliptin (Januvia) 50 MG tablet, Take 1 tablet by mouth Daily., Disp: 90 tablet, Rfl: 3    tiZANidine (ZANAFLEX) 4 MG tablet, Take 1 tablet by mouth. 1 tablet daily, Disp: , Rfl:     ==========================================================================    ALLERGIES    Allergies   Allergen Reactions    Sulfa Antibiotics Dizziness    Codeine Other (See Comments)     pt tolerates norco, but starts w/half tab if needed      Hydrocodone-Acetaminophen Other (See Comments)    Meperidine Anxiety and Other (See Comments)    Tramadol Other (See Comments)       ==========================================================================    OBJECTIVE    Vitals:    01/21/25 1056   BP: 108/58   Pulse: 62   SpO2: 98%     Body mass index is 38.61 kg/m².     General: Alert, cooperative, no acute distress  Thyroid:  no enlargement/tenderness/palpable nodules  Lungs: Clear to auscultation bilaterally,  respirations unlabored  Heart: Regular rate and rhythm, S1 and S2 normal, no murmur, rub or gallop  Abdomen: Soft, NT, ND and Bowel sounds Positive  Extremities:  Extremities normal, atraumatic, no cyanosis or edema, right arm in sling    ==========================================================================    LAB EVALUATION    Lab Results   Component Value Date    GLUCOSE 116 (H) 01/14/2025    BUN 14 01/14/2025    CREATININE 1.07 (H) 01/14/2025    BCR 13 01/14/2025    K 4.4 01/14/2025    CO2 27 01/14/2025    CALCIUM 9.0 01/14/2025    PROTENTOTREF 6.0 01/14/2025    ALBUMIN 3.8 01/14/2025    AST 13 01/14/2025    ALT 9 01/14/2025     Lab Results   Component Value Date    HGBA1C 6.6 (H) 01/14/2025    HGBA1C 6.20 (H) 07/23/2024    HGBA1C 6.40 (H) 04/23/2024     Lab Results   Component Value Date    MICROALBUR 12.7 01/14/2025    CREATININE 1.07 (H) 01/14/2025     Lab Results   Component Value Date    TSH 2.240 07/23/2024    FREET4 1.32 07/23/2024       ==========================================================================    US Thyroid:    3/13/2024:    Left thyroid lobe: 3.8 x 2.5 x 2.9 cm  Right thyroid lobe: 3.9 x 1.4 x 2 cm  Isthmus 3 mm in thickness  Benign spongiform nodule in the right superior thyroid laterally measuring 8 x 7 x 5 mm.    Significant thyroid nodule:  Location: Left mid and central  Size: 3.2 x 2.4 x 3.1 cm  Composition: Mixed cystic and solid  Echogenicity: Hypoechoic  Shape: Wider than tall  Overall TI-RADS 3  FNA 5/9/2024 was benign     ==========================================================================    ASSESSMENT AND PLAN    # Thyroid nodule  - Reviewed ultrasound thyroid report and FNA reports from March and May 2024  - Plan for repeat ultrasound back again in May 2025  - Repeat thyroid function before next visit    # Type 2 diabetes with hyperglycemia  # CKD IIIa  # Obesity with BMI 38.61  - Reviewed blood work and reviewed fingerstick data, blood sugars within acceptable  "limit and A1c also close to target  - Continue metformin and Januvia therapy without any changes  - Patient is not a good candidate for SGLT2 inhibitor therapy due to recurrent dehydration, UTIs which can overall affect the quality of life  - Continue blood sugar monitoring 2-3 times a week  - Microalbumin negative in January 2025 as well  - Therapy will stay as:  Metformin 500 mg twice a day  Januvia 50 mg once a day    Return to clinic: 6 months     Entire assessment and plan was discussed and counseled the patient in detail to which patient verbalized understanding and agreed with care.  Answered all queries and concerns.    This note was created using voice recognition software and is inherently subject to errors including those of syntax and \"sound-alike\" substitutions which may escape proofreading.  In such instances, original meaning may be extrapolated by contextual derivation.    Note: Portions of this note may have been copied from previous notes but documentation have been reviewed and edited as necessary to support clinical decision making for today's visit.    ==========================================================================    INFORMATION PROVIDED TO PATIENT    Patient Instructions   Please,    # Diabetes management:  - Continue Metformin 500 mg 1 pill by mouth twice a day  - Continue Januvia 50 mg 1 pill by mouth daily  - Check your blood sugar 2-3 times a week in a fasting state and maintain logs for your blood sugar number    # Thyroid nodule:  - Plan for Ultrasound Thyroid again in May 2025    Get blood work done before next visit, non fasting    Follow-up in clinic back again in 6 months time.    Thank you for your visit today.    If you have any questions or concerns please feel free to reach out of the office.       ==========================================================================  Hank Brand MD  Department of Endocrine, Diabetes and Metabolism  [facility]" [place], [place]  ==========================================================================

## 2025-01-21 NOTE — PATIENT INSTRUCTIONS
Please,    # Diabetes management:  - Continue Metformin 500 mg 1 pill by mouth twice a day  - Continue Januvia 50 mg 1 pill by mouth daily  - Check your blood sugar 2-3 times a week in a fasting state and maintain logs for your blood sugar number    # Thyroid nodule:  - Plan for Ultrasound Thyroid again in May 2025    Get blood work done before next visit, non fasting    Follow-up in clinic back again in 6 months time.    Thank you for your visit today.    If you have any questions or concerns please feel free to reach out of the office.

## 2025-02-16 ENCOUNTER — APPOINTMENT (OUTPATIENT)
Dept: GENERAL RADIOLOGY | Facility: HOSPITAL | Age: 75
End: 2025-02-16
Payer: MEDICARE

## 2025-02-16 ENCOUNTER — HOSPITAL ENCOUNTER (OUTPATIENT)
Facility: HOSPITAL | Age: 75
Discharge: HOME | End: 2025-02-16
Attending: EMERGENCY MEDICINE | Admitting: EMERGENCY MEDICINE
Payer: MEDICARE

## 2025-02-16 VITALS
DIASTOLIC BLOOD PRESSURE: 46 MMHG | TEMPERATURE: 98.4 F | SYSTOLIC BLOOD PRESSURE: 120 MMHG | HEART RATE: 87 BPM | RESPIRATION RATE: 18 BRPM | OXYGEN SATURATION: 96 %

## 2025-02-16 DIAGNOSIS — U07.1 COVID: Primary | ICD-10-CM

## 2025-02-16 PROCEDURE — 71046 X-RAY EXAM CHEST 2 VIEWS: CPT

## 2025-02-16 PROCEDURE — G0463 HOSPITAL OUTPT CLINIC VISIT: HCPCS | Performed by: NURSE PRACTITIONER

## 2025-02-16 PROCEDURE — 87636 SARSCOV2 & INF A&B AMP PRB: CPT

## 2025-02-16 NOTE — FSED PROVIDER NOTE
"Subjective   History of Present Illness  The patient is a 75-year-old female who presents to the ER stating \"of the carotid started on Wednesday on going out of town next weekend\"    History provided by:  Patient   used: No        Review of Systems   HENT:  Positive for congestion.    Respiratory:  Positive for cough.        Past Medical History:   Diagnosis Date    Arthritis     Cataract     Diabetes mellitus type I 2016    Unsure    GERD (gastroesophageal reflux disease)     Goiter ?    Hyperlipidemia 2008    Hypertension     Type 2 diabetes mellitus 2016    Unsure    Vitamin D deficiency 2020       Allergies   Allergen Reactions    Sulfa Antibiotics Dizziness    Codeine Other (See Comments)     pt tolerates norco, but starts w/half tab if needed      Hydrocodone-Acetaminophen Other (See Comments)    Meperidine Anxiety and Other (See Comments)    Tramadol Other (See Comments)       Past Surgical History:   Procedure Laterality Date    BACK SURGERY      BLADDER SURGERY      CHOLECYSTECTOMY      GASTRIC RESTRICTION SURGERY  2009    Lapband    HYSTERECTOMY      KNEE SURGERY Left     LAPAROSCOPIC GASTRIC BANDING      ORTHOPEDIC SURGERY      PARTIAL KNEE ARTHROPLASTY      SHOULDER ROTATOR CUFF REPAIR         Family History   Problem Relation Age of Onset    Hypertension Mother     Cancer Mother         Oral colon breast    Hypertension Father     Thyroid disease Sister     Cancer Sister         Breast.  Thyroid       Social History     Socioeconomic History    Marital status:    Tobacco Use    Smoking status: Former     Current packs/day: 0.00     Average packs/day: 2.0 packs/day for 16.5 years (32.9 ttl pk-yrs)     Types: Cigarettes     Start date: 2/10/1964     Quit date: 1980     Years since quittin.5     Passive exposure: Never    Smokeless tobacco: Never   Vaping Use    Vaping status: Never Used   Substance and Sexual Activity    Alcohol use: Not Currently    Drug " use: Never    Sexual activity: Not Currently     Partners: Male     Birth control/protection: Post-menopausal, Hysterectomy, Partner of same sex           Objective   Physical Exam  Vitals and nursing note reviewed.   Constitutional:       Appearance: Normal appearance.   HENT:      Head: Normocephalic.      Right Ear: Tympanic membrane and ear canal normal.      Left Ear: Tympanic membrane and ear canal normal.      Nose: Nose normal.      Mouth/Throat:      Lips: Pink.      Mouth: Mucous membranes are moist.      Pharynx: Oropharynx is clear.   Eyes:      Conjunctiva/sclera: Conjunctivae normal.      Pupils: Pupils are equal, round, and reactive to light.   Cardiovascular:      Rate and Rhythm: Normal rate and regular rhythm.      Pulses: Normal pulses.      Heart sounds: Normal heart sounds.   Pulmonary:      Effort: Pulmonary effort is normal.      Breath sounds: Normal breath sounds and air entry.   Abdominal:      General: Bowel sounds are normal.      Palpations: Abdomen is soft.   Musculoskeletal:         General: Normal range of motion.      Cervical back: Full passive range of motion without pain, normal range of motion and neck supple.   Skin:     General: Skin is warm.   Neurological:      General: No focal deficit present.      Mental Status: She is alert and oriented to person, place, and time.   Psychiatric:         Mood and Affect: Mood normal.         Behavior: Behavior normal. Behavior is cooperative.         Procedures           ED Course  ED Course as of 02/16/25 1446   Sun Feb 16, 2025   1422 COVID19(!!): Detected [DS]   1422 Influenza A PCR: Not Detected [DS]   1422 Influenza B PCR: Not Detected [DS]   1439 XR CHEST 2 VW     Date of Exam: 2/16/2025 1:59 PM EST     Indication: cough     Comparison: December 2, 2022     Findings:  The lungs are clear. The heart and mediastinal contours appear normal. There is no pleural effusion. The pulmonary vasculature appears normal. The osseous structures  "appear intact.     IMPRESSION:  Impression:  No acute cardiopulmonary process.   [DS]      ED Course User Index  [DS] BhavinTelloCAILIN Valdez                                           Medical Decision Making  The patient is a 75-year-old female who presents to the ER stating \"of the carotid started on Wednesday on going out of town next weekend.\" This patient presents with symptoms suspicious for  viral upper respiratory infection. Based on history and physical doubt sinusitis. Do not suspect underlying cardiopulmonary process. I considered, but think unlikely, dangerous causes of this patient's symptoms to include ACS, CHF or COPD exacerbations, pneumonia, pneumothorax. Patient is nontoxic appearing and not in need of emergent medical intervention.  Advised take Tylenol/Motrin as needed, also advised patient make sure they are drinking plenty of fluids.    Patient is COVID-positive.    Patient states she is post have a CT scan of her lungs on Tuesday and wants to know if she should proceed with this test I have advised her to call the pulmonologist to order to scan and see if they wish her to proceed or wait and get the scan later.       Problems Addressed:  COVID: complicated acute illness or injury    Amount and/or Complexity of Data Reviewed  Labs:  Decision-making details documented in ED Course.  Radiology: ordered. Decision-making details documented in ED Course.    Risk  OTC drugs.        Final diagnoses:   COVID       ED Disposition  ED Disposition       ED Disposition   Discharge    Condition   Stable    Comment   --               Matilda Peterson, APRN  [address] [facility] [place] [id number] [phone]    Schedule an appointment as soon as possible for a visit in 1 week  As needed, If symptoms worsen         Medication List      No changes were made to your prescriptions during this visit.         "

## 2025-02-16 NOTE — DISCHARGE INSTRUCTIONS
Follow-up with primary care for further evaluation and treatment as needed.    Tylenol/Motrin as needed for pain/fevers    Make sure patient is drinking plenty of fluids.    PER CDC guidelines as of May 2024,  you should be fever free without tylenol/motrin for 24 hours, and you are feeling better you can go back to regular activities.  If you continue to have symptoms you should wear mask for 5 days. You can always look up the CDC guidelines at CDC.GOV/COVID    Return for any new or worsening symptoms.      Voice recognition transcription technology was used for documentation on this chart.  Result there may be some typos and/or introduced into the chart that were overlooked during editing reviewing.

## 2025-05-22 ENCOUNTER — HOSPITAL ENCOUNTER (OUTPATIENT)
Dept: ULTRASOUND IMAGING | Facility: HOSPITAL | Age: 75
Discharge: HOME | End: 2025-05-22
Admitting: INTERNAL MEDICINE
Payer: MEDICARE

## 2025-05-22 DIAGNOSIS — E04.1 THYROID NODULE: ICD-10-CM

## 2025-05-22 PROCEDURE — 76536 US EXAM OF HEAD AND NECK: CPT

## 2025-07-15 ENCOUNTER — LAB (OUTPATIENT)
Dept: ENDOCRINOLOGY | Facility: CLINIC | Age: 75
End: 2025-07-15
Payer: MEDICARE

## 2025-07-15 DIAGNOSIS — E11.65 TYPE 2 DIABETES MELLITUS WITH HYPERGLYCEMIA, WITHOUT LONG-TERM CURRENT USE OF INSULIN: ICD-10-CM

## 2025-07-15 DIAGNOSIS — E04.1 THYROID NODULE: ICD-10-CM

## 2025-07-16 LAB
ALBUMIN SERPL-MCNC: 3.6 G/DL (ref 3.8–4.8)
ALP SERPL-CCNC: 58 IU/L (ref 44–121)
ALT SERPL-CCNC: 9 IU/L (ref 0–32)
AST SERPL-CCNC: 14 IU/L (ref 0–40)
BILIRUB SERPL-MCNC: 0.5 MG/DL (ref 0–1.2)
BUN SERPL-MCNC: 11 MG/DL (ref 8–27)
BUN/CREAT SERPL: 12 (ref 12–28)
CALCIUM SERPL-MCNC: 9.2 MG/DL (ref 8.7–10.3)
CHLORIDE SERPL-SCNC: 103 MMOL/L (ref 96–106)
CO2 SERPL-SCNC: 20 MMOL/L (ref 20–29)
CREAT SERPL-MCNC: 0.89 MG/DL (ref 0.57–1)
EGFRCR SERPLBLD CKD-EPI 2021: 67 ML/MIN/1.73
GLOBULIN SER CALC-MCNC: 1.9 G/DL (ref 1.5–4.5)
GLUCOSE SERPL-MCNC: 111 MG/DL (ref 70–99)
HBA1C MFR BLD: 6.4 % (ref 4.8–5.6)
POTASSIUM SERPL-SCNC: 3.9 MMOL/L (ref 3.5–5.2)
PROT SERPL-MCNC: 5.5 G/DL (ref 6–8.5)
SODIUM SERPL-SCNC: 140 MMOL/L (ref 134–144)
T4 FREE SERPL-MCNC: 1.13 NG/DL (ref 0.82–1.77)
TSH SERPL DL<=0.005 MIU/L-ACNC: 2.25 UIU/ML (ref 0.45–4.5)

## 2025-07-22 ENCOUNTER — OFFICE VISIT (OUTPATIENT)
Dept: ENDOCRINOLOGY | Facility: CLINIC | Age: 75
End: 2025-07-22
Payer: MEDICARE

## 2025-07-22 VITALS
BODY MASS INDEX: 39.49 KG/M2 | DIASTOLIC BLOOD PRESSURE: 80 MMHG | SYSTOLIC BLOOD PRESSURE: 130 MMHG | HEART RATE: 70 BPM | OXYGEN SATURATION: 98 % | HEIGHT: 65 IN | WEIGHT: 237 LBS

## 2025-07-22 DIAGNOSIS — E66.9 OBESITY (BMI 30-39.9): ICD-10-CM

## 2025-07-22 DIAGNOSIS — E11.65 TYPE 2 DIABETES MELLITUS WITH HYPERGLYCEMIA, WITHOUT LONG-TERM CURRENT USE OF INSULIN: Primary | ICD-10-CM

## 2025-07-22 DIAGNOSIS — E04.1 THYROID NODULE: ICD-10-CM

## 2025-07-22 DIAGNOSIS — N18.2 CKD (CHRONIC KIDNEY DISEASE), STAGE II: ICD-10-CM

## 2025-07-22 LAB — GLUCOSE BLDC GLUCOMTR-MCNC: 115 MG/DL (ref 70–105)

## 2025-07-22 PROCEDURE — 3079F DIAST BP 80-89 MM HG: CPT | Performed by: INTERNAL MEDICINE

## 2025-07-22 PROCEDURE — 99214 OFFICE O/P EST MOD 30 MIN: CPT | Performed by: INTERNAL MEDICINE

## 2025-07-22 PROCEDURE — G2211 COMPLEX E/M VISIT ADD ON: HCPCS | Performed by: INTERNAL MEDICINE

## 2025-07-22 PROCEDURE — 3075F SYST BP GE 130 - 139MM HG: CPT | Performed by: INTERNAL MEDICINE

## 2025-07-22 PROCEDURE — 82948 REAGENT STRIP/BLOOD GLUCOSE: CPT | Performed by: INTERNAL MEDICINE

## 2025-07-22 RX ORDER — NITROFURANTOIN MACROCRYSTALS 50 MG/1
CAPSULE ORAL
COMMUNITY
Start: 2025-05-29

## 2025-07-22 RX ORDER — HYDROCODONE BITARTRATE AND ACETAMINOPHEN 10; 325 MG/1; MG/1
TABLET ORAL
COMMUNITY
Start: 2025-05-28

## 2025-07-22 RX ORDER — FUROSEMIDE 20 MG/1
20 TABLET ORAL
COMMUNITY
Start: 2025-02-27

## 2025-07-22 NOTE — PROGRESS NOTES
-----------------------------------------------------------------  ENDOCRINE CLINIC NOTE  -----------------------------------------------------------------        PATIENT NAME: Sofia Chavez  PATIENT : 1950 AGE: 75 y.o.  MRN NUMBER: [id number]  PRIMARY CARE: Matilda Peterson APRN    ==========================================================================    CHIEF COMPLAINT: Diabetes management and Thyroid Nodule  DATE OF SERVICE: 25    ==========================================================================    HPI / SUBJECTIVE    75 y.o. female is seen in the clinic today for diabetes management and thyroid nodule.    Diabetes Mellitus Hx:  Diagnosis Date: 2020  Current Therapy / Medications:  Metformin 500 mg twice a day  Januvia 50 mg once a day  Past tried medications: (Not currently using) None  Home BG logs / monitoring: Finger sticks in target  Meals / Dietary Habits: Two meals a day  Following Dr. Noonan's office, following Q6 months   Neuropathy: None  CKD II with frequent UTI and VALERIE, following urology for bladder issues  -ve Microalbumin -ve in 2025  No CAD or CVA    Thyroid Nodule / Goiter:    - Reported to have thyroid nodule for last 4 yrs time around  to , patient had this evaluation for thyroid nodule as an incidental finding  - Previously have seen Dr. Mcdonnell   - US thyroid in 2024 at [facility] radiology and FNA done in May 2024 and no malignancy identified  - Repeat US in May 2025 and stable nodule    ==========================================================================                                                PAST MEDICAL HISTORY    Past Medical History:   Diagnosis Date    Arthritis     Cataract     Diabetes mellitus type I 2016    Unsure    GERD (gastroesophageal reflux disease)     Goiter ?    Hyperlipidemia 2008    Hypertension     Type 2 diabetes mellitus 2016    Unsure    Vitamin D deficiency 2020        ==========================================================================    PAST SURGICAL HISTORY    Past Surgical History:   Procedure Laterality Date    BACK SURGERY      BLADDER SURGERY      CHOLECYSTECTOMY      GASTRIC RESTRICTION SURGERY  2009    Lapband    HYSTERECTOMY      KNEE SURGERY Left     LAPAROSCOPIC GASTRIC BANDING      ORTHOPEDIC SURGERY      PARTIAL KNEE ARTHROPLASTY      SHOULDER ROTATOR CUFF REPAIR         ==========================================================================    FAMILY HISTORY    Family History   Problem Relation Age of Onset    Hypertension Mother     Cancer Mother         Oral colon breast    Hypertension Father     Thyroid disease Sister     Cancer Sister         Breast.  Thyroid       ==========================================================================    SOCIAL HISTORY    Social History     Socioeconomic History    Marital status:    Tobacco Use    Smoking status: Former     Current packs/day: 0.00     Average packs/day: 2.0 packs/day for 16.5 years (32.9 ttl pk-yrs)     Types: Cigarettes     Start date: 2/10/1964     Quit date: 1980     Years since quittin.0     Passive exposure: Never    Smokeless tobacco: Never   Vaping Use    Vaping status: Never Used   Substance and Sexual Activity    Alcohol use: Not Currently    Drug use: Never    Sexual activity: Not Currently     Partners: Male     Birth control/protection: Post-menopausal, Hysterectomy, Partner of same sex       ==========================================================================    MEDICATIONS      Current Outpatient Medications:     benzonatate (TESSALON) 200 MG capsule, Take 1 capsule by mouth 3 (Three) Times a Day As Needed for Cough., Disp: 15 capsule, Rfl: 0    citalopram (CeleXA) 40 MG tablet, Take 1 tablet by mouth Daily., Disp: , Rfl:     ergocalciferol (ERGOCALCIFEROL) 1.25 MG (41560 UT) capsule, , Disp: , Rfl:     glucose blood test strip, 1 each by Other route  Every Other Day., Disp: 100 each, Rfl: 5    glucose monitor monitoring kit, Use 1 each Every Other Day., Disp: 1 each, Rfl: 0    Lancets misc, Use 1 each Every Other Day., Disp: 100 each, Rfl: 5    melatonin 5 MG tablet tablet, 2 tablets., Disp: , Rfl:     meloxicam (MOBIC) 7.5 MG tablet, , Disp: , Rfl:     metFORMIN ER (GLUCOPHAGE-XR) 500 MG 24 hr tablet, Take 1 tablet by mouth 2 (Two) Times a Day. Take 2 tablets p.o. daily., Disp: 180 tablet, Rfl: 3    Mirabegron ER (MYRBETRIQ) 50 MG tablet sustained-release 24 hour 24 hr tablet, Take 50 mg by mouth Daily., Disp: , Rfl:     Olmesartan-amLODIPine-HCTZ 20-5-12.5 MG tablet, , Disp: , Rfl:     olmesartan-hydrochlorothiazide (BENICAR HCT) 40-12.5 MG per tablet, Take 1 tablet by mouth Daily., Disp: , Rfl:     pravastatin (PRAVACHOL) 40 MG tablet, Take 2 tablets by mouth Daily., Disp: , Rfl:     propranolol LA (INDERAL LA) 120 MG 24 hr capsule, Take 1 capsule by mouth Daily., Disp: , Rfl:     SITagliptin (Januvia) 50 MG tablet, Take 1 tablet by mouth Daily., Disp: 90 tablet, Rfl: 3    tiZANidine (ZANAFLEX) 4 MG tablet, Take 1 tablet by mouth. 1 tablet daily, Disp: , Rfl:     ==========================================================================    ALLERGIES    Allergies   Allergen Reactions    Sulfa Antibiotics Dizziness    Codeine Other (See Comments)     pt tolerates norco, but starts w/half tab if needed      Hydrocodone-Acetaminophen Other (See Comments)    Meperidine Anxiety and Other (See Comments)    Tramadol Other (See Comments)       ==========================================================================    OBJECTIVE    Vitals:    07/22/25 1104   BP: 130/80   Pulse: 70   SpO2: 98%       Body mass index is 39.44 kg/m².     General: Alert, cooperative, no acute distress  Thyroid:  no enlargement/tenderness/palpable nodules  Lungs: Clear to auscultation bilaterally, respirations unlabored  Heart: Regular rate and rhythm, S1 and S2 normal, no murmur, rub or  gallop  Abdomen: Soft, NT, ND and Bowel sounds Positive  Extremities:  Extremities normal, atraumatic, no cyanosis or edema, right arm in sling    ==========================================================================    LAB EVALUATION    Lab Results   Component Value Date    GLUCOSE 111 (H) 07/15/2025    BUN 11 07/15/2025    CREATININE 0.89 07/15/2025    BCR 12 07/15/2025    K 3.9 07/15/2025    CO2 20 07/15/2025    CALCIUM 9.2 07/15/2025    ALBUMIN 3.6 (L) 07/15/2025    AST 14 07/15/2025    ALT 9 07/15/2025     Lab Results   Component Value Date    HGBA1C 6.4 (H) 07/15/2025    HGBA1C 6.6 (H) 01/14/2025    HGBA1C 6.20 (H) 07/23/2024     Lab Results   Component Value Date    MICROALBUR 12.7 01/14/2025    CREATININE 0.89 07/15/2025     Lab Results   Component Value Date    TSH 2.250 07/15/2025    FREET4 1.13 07/15/2025       ==========================================================================    US Thyroid:    3/13/2024:     Left thyroid lobe: 3.8 x 2.5 x 2.9 cm  Right thyroid lobe: 3.9 x 1.4 x 2 cm  Isthmus 3 mm in thickness  Benign spongiform nodule in the right superior thyroid laterally measuring 8 x 7 x 5 mm.     Significant thyroid nodule:  Location: Left mid and central  Size: 3.2 x 2.4 x 3.1 cm  Composition: Mixed cystic and solid  Echogenicity: Hypoechoic  Shape: Wider than tall  Overall TI-RADS 3  FNA 5/9/2024 was benign      5/22/2025 11:12 AM EDT     Right thyroid measures 3.2 x 1.6 x 1.6 cm  Left thyroid measures 4.5 x 3.1 x 2.6 cm  Heterogeneous background thyroid echotexture  Nodules over 1 cm detailed below.     Nodule 1:  Location: Left  Size: 3 x 3.1 x 2.5 cm  Composition: Mixed cystic and solid  Echogenicity: isoechoic with Shadowing macrocalcification  Shape: Wider than tall - Smooth margin  Overall TI-RADS 4  FNA 5/9/2024 was benign   Size appears similar to prior exam.    ==========================================================================    ASSESSMENT AND PLAN    # Thyroid  "nodule  - Reviewed ultrasound thyroid report and FNA reports from March and May 2024  - Repeat ultrasound in May 2025, stable  - Repeat thyroid US in one year    # Type 2 diabetes with hyperglycemia  # CKD II  # Obesity with BMI 39.44  - Reviewed blood work and reviewed fingerstick data, blood sugars within acceptable limit and A1c also within acceptable limits  - Continue metformin and Januvia therapy without any changes  - Patient is not a good candidate for SGLT2 inhibitor therapy due to recurrent dehydration secondary to UTIs which can overall affect the quality of life  - Continue blood sugar monitoring with fingersticks  - Last microalbumin negative in January 2025  - Therapy will stay as:  Metformin 500 mg twice a day  Januvia 50 mg once a day    Return to clinic: 6 months     Entire assessment and plan was discussed and counseled the patient in detail to which patient verbalized understanding and agreed with care.  Answered all queries and concerns.    This note was created using voice recognition software and is inherently subject to errors including those of syntax and \"sound-alike\" substitutions which may escape proofreading.  In such instances, original meaning may be extrapolated by contextual derivation.    Note: Portions of this note may have been copied from previous notes but documentation have been reviewed and edited as necessary to support clinical decision making for today's visit.    ==========================================================================    INFORMATION PROVIDED TO PATIENT    Patient Instructions   Please,    # Diabetes management:  - Continue Metformin 500 mg 1 pill by mouth twice a day  - Continue Januvia 50 mg 1 pill by mouth daily  - Check your blood sugar 2-3 times a week in a fasting state and maintain logs for your blood sugar number    # Thyroid nodule:  - Plan for Ultrasound Thyroid again in May 2026    Get blood work done before next visit, non fasting    Follow-up " in clinic back again in 6 months time.    Thank you for your visit today.    If you have any questions or concerns please feel free to reach out of the office.       ==========================================================================  Hank Brand MD  Department of Endocrine, Diabetes and Metabolism  [place], [place]  ==========================================================================

## 2025-07-22 NOTE — PATIENT INSTRUCTIONS
Please,    # Diabetes management:  - Continue Metformin 500 mg 1 pill by mouth twice a day  - Continue Januvia 50 mg 1 pill by mouth daily  - Check your blood sugar 2-3 times a week in a fasting state and maintain logs for your blood sugar number    # Thyroid nodule:  - Plan for Ultrasound Thyroid again in May 2026    Get blood work done before next visit, non fasting    Follow-up in clinic back again in 6 months time.    Thank you for your visit today.    If you have any questions or concerns please feel free to reach out of the office.